# Patient Record
Sex: MALE | Race: WHITE | NOT HISPANIC OR LATINO | Employment: FULL TIME | URBAN - METROPOLITAN AREA
[De-identification: names, ages, dates, MRNs, and addresses within clinical notes are randomized per-mention and may not be internally consistent; named-entity substitution may affect disease eponyms.]

---

## 2019-12-21 ENCOUNTER — OFFICE VISIT (OUTPATIENT)
Dept: URGENT CARE | Facility: CLINIC | Age: 54
End: 2019-12-21
Payer: COMMERCIAL

## 2019-12-21 VITALS
HEART RATE: 70 BPM | HEIGHT: 70 IN | BODY MASS INDEX: 28.63 KG/M2 | SYSTOLIC BLOOD PRESSURE: 158 MMHG | OXYGEN SATURATION: 96 % | DIASTOLIC BLOOD PRESSURE: 98 MMHG | WEIGHT: 200 LBS | TEMPERATURE: 97.4 F | RESPIRATION RATE: 16 BRPM

## 2019-12-21 DIAGNOSIS — H10.31 ACUTE CONJUNCTIVITIS OF RIGHT EYE, UNSPECIFIED ACUTE CONJUNCTIVITIS TYPE: Primary | ICD-10-CM

## 2019-12-21 PROCEDURE — 99213 OFFICE O/P EST LOW 20 MIN: CPT | Performed by: PHYSICIAN ASSISTANT

## 2019-12-21 RX ORDER — TOBRAMYCIN 3 MG/ML
1 SOLUTION/ DROPS OPHTHALMIC
Qty: 5 ML | Refills: 0 | Status: SHIPPED | OUTPATIENT
Start: 2019-12-21

## 2019-12-21 RX ORDER — SIMVASTATIN 20 MG
20 TABLET ORAL
COMMUNITY

## 2019-12-21 NOTE — PROGRESS NOTES
[facility]        NAME: Christopher Jeffery is a 62 y o  male  : 1965    MRN: [id number]  DATE: 2019  TIME: 9:19 AM    Assessment and Plan   Acute conjunctivitis of right eye, unspecified acute conjunctivitis type [H10 31]  1  Acute conjunctivitis of right eye, unspecified acute conjunctivitis type  tobramycin (TOBREX) 0 3 % SOLN         Patient Instructions     Discussed condition with patient  He has acute conjunctivitis of the right eye which I will treat with tobramycin drops and I reviewed precautions with him regarding pinkeye  Examination revealed no sign of corneal abrasion or foreign body  Follow up with PCP in 3-5 days  Proceed to  ER if symptoms worsen  Chief Complaint     Chief Complaint   Patient presents with    Eye Problem     Right eye redness with irritation and itchiness and drainage  S/S started this morning         History of Present Illness       Patient presents with onset the past day of redness, irritation, watering, crusting and itching of his right eye  He does I were contact lenses  Denies foreign body or trauma  Denies any symptoms in the left eye  He denies any visual changes, eye pain, photophobia  Review of Systems   Review of Systems   Constitutional: Negative  Eyes: Positive for discharge (Watery), redness and itching  Negative for photophobia, pain and visual disturbance  Pertinent positives in right eye   Respiratory: Negative  Cardiovascular: Negative  Gastrointestinal: Negative  Genitourinary: Negative            Current Medications       Current Outpatient Medications:     simvastatin (ZOCOR) 20 mg tablet, Take 20 mg by mouth daily at bedtime, Disp: , Rfl:     tobramycin (TOBREX) 0 3 % SOLN, Administer 1 drop to the right eye every 4 (four) hours while awake, Disp: 5 mL, Rfl: 0    Current Allergies     Allergies as of 2019    (No Known Allergies)            The following portions of the patient's history were reviewed and updated as appropriate: allergies, current medications, past family history, past medical history, past social history, past surgical history and problem list      Past Medical History:   Diagnosis Date    High cholesterol        History reviewed  No pertinent surgical history  History reviewed  No pertinent family history  Medications have been verified  Objective   /98   Pulse 70   Temp (!) 97 4 °F (36 3 °C)   Resp 16   Ht 5' 10" (1 778 m)   Wt 90 7 kg (200 lb)   SpO2 96%   BMI 28 70 kg/m²        Physical Exam     Physical Exam   Constitutional: He is oriented to person, place, and time  He appears well-developed and well-nourished  No distress  Eyes:   Slit lamp exam:       The right eye shows no corneal abrasion, no foreign body and no fluorescein uptake  Right eye with conjunctival injection, watery discharge, mild crusting on lid margins, and photophobia  I instilled tetracaine drops and examined with fluorescein which revealed no corneal abrasion or imbedded foreign body  Left eye exam is normal    Neurological: He is alert and oriented to person, place, and time  Psychiatric: He has a normal mood and affect  Vitals reviewed

## 2019-12-21 NOTE — PATIENT INSTRUCTIONS

## 2023-07-28 ENCOUNTER — ANESTHESIA (EMERGENCY)
Dept: PERIOP | Facility: HOSPITAL | Age: 58
End: 2023-07-28
Payer: COMMERCIAL

## 2023-07-28 ENCOUNTER — APPOINTMENT (EMERGENCY)
Dept: CT IMAGING | Facility: HOSPITAL | Age: 58
End: 2023-07-28
Payer: COMMERCIAL

## 2023-07-28 ENCOUNTER — ANESTHESIA EVENT (EMERGENCY)
Dept: PERIOP | Facility: HOSPITAL | Age: 58
End: 2023-07-28
Payer: COMMERCIAL

## 2023-07-28 ENCOUNTER — HOSPITAL ENCOUNTER (OUTPATIENT)
Facility: HOSPITAL | Age: 58
Setting detail: OBSERVATION
End: 2023-07-29
Attending: SURGERY | Admitting: SURGERY
Payer: COMMERCIAL

## 2023-07-28 DIAGNOSIS — S41.112A LACERATION OF MULTIPLE SITES OF LEFT UPPER EXTREMITY, INITIAL ENCOUNTER: ICD-10-CM

## 2023-07-28 DIAGNOSIS — T07.XXXA MULTIPLE LACERATIONS: Primary | ICD-10-CM

## 2023-07-28 DIAGNOSIS — T14.91XA SUICIDE ATTEMPT (HCC): ICD-10-CM

## 2023-07-28 PROBLEM — S41.119A: Status: ACTIVE | Noted: 2023-07-28

## 2023-07-28 PROBLEM — G47.30 SLEEP APNEA: Status: ACTIVE | Noted: 2023-07-28

## 2023-07-28 PROBLEM — S11.91XA LACERATION OF NECK: Status: ACTIVE | Noted: 2023-07-28

## 2023-07-28 PROBLEM — I10 HTN (HYPERTENSION): Status: ACTIVE | Noted: 2023-07-28

## 2023-07-28 LAB
ABO GROUP BLD: NORMAL
ANION GAP SERPL CALCULATED.3IONS-SCNC: 8 MMOL/L
ANION GAP SERPL CALCULATED.3IONS-SCNC: 8 MMOL/L
BASE EXCESS BLDA CALC-SCNC: -2 MMOL/L (ref -2–3)
BASE EXCESS BLDA CALC-SCNC: -2 MMOL/L (ref -2–3)
BASOPHILS # BLD AUTO: 0.05 THOUSANDS/ÂΜL (ref 0–0.1)
BASOPHILS # BLD AUTO: 0.05 THOUSANDS/ÂΜL (ref 0–0.1)
BASOPHILS NFR BLD AUTO: 0 % (ref 0–1)
BASOPHILS NFR BLD AUTO: 0 % (ref 0–1)
BLD GP AB SCN SERPL QL: NEGATIVE
BLD GP AB SCN SERPL QL: NEGATIVE
BUN SERPL-MCNC: 16 MG/DL (ref 5–25)
BUN SERPL-MCNC: 16 MG/DL (ref 5–25)
CA-I BLD-SCNC: 1.1 MMOL/L (ref 1.12–1.32)
CA-I BLD-SCNC: 1.1 MMOL/L (ref 1.12–1.32)
CALCIUM SERPL-MCNC: 9.3 MG/DL (ref 8.4–10.2)
CALCIUM SERPL-MCNC: 9.3 MG/DL (ref 8.4–10.2)
CHLORIDE SERPL-SCNC: 104 MMOL/L (ref 96–108)
CHLORIDE SERPL-SCNC: 104 MMOL/L (ref 96–108)
CO2 SERPL-SCNC: 26 MMOL/L (ref 21–32)
CO2 SERPL-SCNC: 26 MMOL/L (ref 21–32)
CREAT SERPL-MCNC: 0.89 MG/DL (ref 0.6–1.3)
CREAT SERPL-MCNC: 0.89 MG/DL (ref 0.6–1.3)
EOSINOPHIL # BLD AUTO: 0 THOUSAND/ÂΜL (ref 0–0.61)
EOSINOPHIL # BLD AUTO: 0 THOUSAND/ÂΜL (ref 0–0.61)
EOSINOPHIL NFR BLD AUTO: 0 % (ref 0–6)
EOSINOPHIL NFR BLD AUTO: 0 % (ref 0–6)
ERYTHROCYTE [DISTWIDTH] IN BLOOD BY AUTOMATED COUNT: 13 % (ref 11.6–15.1)
ERYTHROCYTE [DISTWIDTH] IN BLOOD BY AUTOMATED COUNT: 13 % (ref 11.6–15.1)
GFR SERPL CREATININE-BSD FRML MDRD: 93 ML/MIN/1.73SQ M
GFR SERPL CREATININE-BSD FRML MDRD: 93 ML/MIN/1.73SQ M
GLUCOSE P FAST SERPL-MCNC: 142 MG/DL (ref 65–99)
GLUCOSE P FAST SERPL-MCNC: 142 MG/DL (ref 65–99)
GLUCOSE SERPL-MCNC: 142 MG/DL (ref 65–140)
GLUCOSE SERPL-MCNC: 142 MG/DL (ref 65–140)
GLUCOSE SERPL-MCNC: 166 MG/DL (ref 65–140)
GLUCOSE SERPL-MCNC: 166 MG/DL (ref 65–140)
HCO3 BLDA-SCNC: 21.3 MMOL/L (ref 24–30)
HCO3 BLDA-SCNC: 21.3 MMOL/L (ref 24–30)
HCT VFR BLD AUTO: 35.3 % (ref 36.5–49.3)
HCT VFR BLD AUTO: 35.3 % (ref 36.5–49.3)
HCT VFR BLD CALC: 33 % (ref 36.5–49.3)
HCT VFR BLD CALC: 33 % (ref 36.5–49.3)
HGB BLD-MCNC: 11.8 G/DL (ref 12–17)
HGB BLD-MCNC: 11.8 G/DL (ref 12–17)
HGB BLDA-MCNC: 11.2 G/DL (ref 12–17)
HGB BLDA-MCNC: 11.2 G/DL (ref 12–17)
HOLD SPECIMEN: NORMAL
IMM GRANULOCYTES # BLD AUTO: 0.18 THOUSAND/UL (ref 0–0.2)
IMM GRANULOCYTES # BLD AUTO: 0.18 THOUSAND/UL (ref 0–0.2)
IMM GRANULOCYTES NFR BLD AUTO: 1 % (ref 0–2)
IMM GRANULOCYTES NFR BLD AUTO: 1 % (ref 0–2)
LYMPHOCYTES # BLD AUTO: 1.64 THOUSANDS/ÂΜL (ref 0.6–4.47)
LYMPHOCYTES # BLD AUTO: 1.64 THOUSANDS/ÂΜL (ref 0.6–4.47)
LYMPHOCYTES NFR BLD AUTO: 8 % (ref 14–44)
LYMPHOCYTES NFR BLD AUTO: 8 % (ref 14–44)
MCH RBC QN AUTO: 31.3 PG (ref 26.8–34.3)
MCH RBC QN AUTO: 31.3 PG (ref 26.8–34.3)
MCHC RBC AUTO-ENTMCNC: 33.4 G/DL (ref 31.4–37.4)
MCHC RBC AUTO-ENTMCNC: 33.4 G/DL (ref 31.4–37.4)
MCV RBC AUTO: 94 FL (ref 82–98)
MCV RBC AUTO: 94 FL (ref 82–98)
MONOCYTES # BLD AUTO: 0.46 THOUSAND/ÂΜL (ref 0.17–1.22)
MONOCYTES # BLD AUTO: 0.46 THOUSAND/ÂΜL (ref 0.17–1.22)
MONOCYTES NFR BLD AUTO: 2 % (ref 4–12)
MONOCYTES NFR BLD AUTO: 2 % (ref 4–12)
NEUTROPHILS # BLD AUTO: 17.1 THOUSANDS/ÂΜL (ref 1.85–7.62)
NEUTROPHILS # BLD AUTO: 17.1 THOUSANDS/ÂΜL (ref 1.85–7.62)
NEUTS SEG NFR BLD AUTO: 89 % (ref 43–75)
NEUTS SEG NFR BLD AUTO: 89 % (ref 43–75)
NRBC BLD AUTO-RTO: 0 /100 WBCS
NRBC BLD AUTO-RTO: 0 /100 WBCS
PCO2 BLD: 22 MMOL/L (ref 21–32)
PCO2 BLD: 22 MMOL/L (ref 21–32)
PCO2 BLD: 30.4 MM HG (ref 42–50)
PCO2 BLD: 30.4 MM HG (ref 42–50)
PH BLD: 7.46 [PH] (ref 7.3–7.4)
PH BLD: 7.46 [PH] (ref 7.3–7.4)
PLATELET # BLD AUTO: 352 THOUSANDS/UL (ref 149–390)
PLATELET # BLD AUTO: 352 THOUSANDS/UL (ref 149–390)
PMV BLD AUTO: 9.8 FL (ref 8.9–12.7)
PMV BLD AUTO: 9.8 FL (ref 8.9–12.7)
PO2 BLD: 38 MM HG (ref 35–45)
PO2 BLD: 38 MM HG (ref 35–45)
POTASSIUM BLD-SCNC: 3.6 MMOL/L (ref 3.5–5.3)
POTASSIUM BLD-SCNC: 3.6 MMOL/L (ref 3.5–5.3)
POTASSIUM SERPL-SCNC: 4.2 MMOL/L (ref 3.5–5.3)
POTASSIUM SERPL-SCNC: 4.2 MMOL/L (ref 3.5–5.3)
RBC # BLD AUTO: 3.77 MILLION/UL (ref 3.88–5.62)
RBC # BLD AUTO: 3.77 MILLION/UL (ref 3.88–5.62)
RH BLD: NEGATIVE
SAO2 % BLD FROM PO2: 76 % (ref 60–85)
SAO2 % BLD FROM PO2: 76 % (ref 60–85)
SODIUM BLD-SCNC: 140 MMOL/L (ref 136–145)
SODIUM BLD-SCNC: 140 MMOL/L (ref 136–145)
SODIUM SERPL-SCNC: 138 MMOL/L (ref 135–147)
SODIUM SERPL-SCNC: 138 MMOL/L (ref 135–147)
SPECIMEN EXPIRATION DATE: NORMAL
SPECIMEN EXPIRATION DATE: NORMAL
SPECIMEN SOURCE: ABNORMAL
SPECIMEN SOURCE: ABNORMAL
WBC # BLD AUTO: 19.43 THOUSAND/UL (ref 4.31–10.16)
WBC # BLD AUTO: 19.43 THOUSAND/UL (ref 4.31–10.16)

## 2023-07-28 PROCEDURE — 86850 RBC ANTIBODY SCREEN: CPT | Performed by: SURGERY

## 2023-07-28 PROCEDURE — 82947 ASSAY GLUCOSE BLOOD QUANT: CPT

## 2023-07-28 PROCEDURE — 84132 ASSAY OF SERUM POTASSIUM: CPT

## 2023-07-28 PROCEDURE — 85014 HEMATOCRIT: CPT

## 2023-07-28 PROCEDURE — 93308 TTE F-UP OR LMTD: CPT | Performed by: PHYSICIAN ASSISTANT

## 2023-07-28 PROCEDURE — NC001 PR NO CHARGE: Performed by: PHYSICIAN ASSISTANT

## 2023-07-28 PROCEDURE — 82330 ASSAY OF CALCIUM: CPT

## 2023-07-28 PROCEDURE — 36415 COLL VENOUS BLD VENIPUNCTURE: CPT | Performed by: SURGERY

## 2023-07-28 PROCEDURE — 99285 EMERGENCY DEPT VISIT HI MDM: CPT

## 2023-07-28 PROCEDURE — 82803 BLOOD GASES ANY COMBINATION: CPT

## 2023-07-28 PROCEDURE — 86900 BLOOD TYPING SEROLOGIC ABO: CPT | Performed by: SURGERY

## 2023-07-28 PROCEDURE — 99223 1ST HOSP IP/OBS HIGH 75: CPT | Performed by: SURGERY

## 2023-07-28 PROCEDURE — 86901 BLOOD TYPING SEROLOGIC RH(D): CPT | Performed by: SURGERY

## 2023-07-28 PROCEDURE — 84295 ASSAY OF SERUM SODIUM: CPT

## 2023-07-28 PROCEDURE — 85025 COMPLETE CBC W/AUTO DIFF WBC: CPT | Performed by: PHYSICIAN ASSISTANT

## 2023-07-28 PROCEDURE — 90715 TDAP VACCINE 7 YRS/> IM: CPT | Performed by: PHYSICIAN ASSISTANT

## 2023-07-28 PROCEDURE — 99244 OFF/OP CNSLTJ NEW/EST MOD 40: CPT | Performed by: ORTHOPAEDIC SURGERY

## 2023-07-28 PROCEDURE — 80048 BASIC METABOLIC PNL TOTAL CA: CPT | Performed by: PHYSICIAN ASSISTANT

## 2023-07-28 PROCEDURE — 70498 CT ANGIOGRAPHY NECK: CPT

## 2023-07-28 PROCEDURE — G0425 INPT/ED TELECONSULT30: HCPCS | Performed by: GENERAL PRACTICE

## 2023-07-28 PROCEDURE — 12007 RPR S/N/AX/GEN/TRNK >30.0 CM: CPT | Performed by: SURGERY

## 2023-07-28 PROCEDURE — 29125 APPL SHORT ARM SPLINT STATIC: CPT | Performed by: PHYSICIAN ASSISTANT

## 2023-07-28 PROCEDURE — 76705 ECHO EXAM OF ABDOMEN: CPT | Performed by: PHYSICIAN ASSISTANT

## 2023-07-28 RX ORDER — MAGNESIUM HYDROXIDE 1200 MG/15ML
LIQUID ORAL AS NEEDED
Status: DISCONTINUED | OUTPATIENT
Start: 2023-07-28 | End: 2023-07-28 | Stop reason: HOSPADM

## 2023-07-28 RX ORDER — HYDROMORPHONE HCL/PF 1 MG/ML
SYRINGE (ML) INJECTION AS NEEDED
Status: DISCONTINUED | OUTPATIENT
Start: 2023-07-28 | End: 2023-07-28

## 2023-07-28 RX ORDER — LISINOPRIL 10 MG/1
10 TABLET ORAL DAILY
COMMUNITY
End: 2023-08-14 | Stop reason: ALTCHOICE

## 2023-07-28 RX ORDER — ENOXAPARIN SODIUM 100 MG/ML
30 INJECTION SUBCUTANEOUS EVERY 12 HOURS SCHEDULED
Status: DISCONTINUED | OUTPATIENT
Start: 2023-07-28 | End: 2023-07-30 | Stop reason: HOSPADM

## 2023-07-28 RX ORDER — MIDAZOLAM HYDROCHLORIDE 2 MG/2ML
INJECTION, SOLUTION INTRAMUSCULAR; INTRAVENOUS AS NEEDED
Status: DISCONTINUED | OUTPATIENT
Start: 2023-07-28 | End: 2023-07-28

## 2023-07-28 RX ORDER — CEFAZOLIN SODIUM 1 G/3ML
INJECTION, POWDER, FOR SOLUTION INTRAMUSCULAR; INTRAVENOUS AS NEEDED
Status: DISCONTINUED | OUTPATIENT
Start: 2023-07-28 | End: 2023-07-28

## 2023-07-28 RX ORDER — AMLODIPINE BESYLATE 5 MG/1
5 TABLET ORAL DAILY
COMMUNITY

## 2023-07-28 RX ORDER — SODIUM CHLORIDE, SODIUM LACTATE, POTASSIUM CHLORIDE, CALCIUM CHLORIDE 600; 310; 30; 20 MG/100ML; MG/100ML; MG/100ML; MG/100ML
INJECTION, SOLUTION INTRAVENOUS CONTINUOUS PRN
Status: DISCONTINUED | OUTPATIENT
Start: 2023-07-28 | End: 2023-07-28

## 2023-07-28 RX ORDER — LIDOCAINE HYDROCHLORIDE 10 MG/ML
INJECTION, SOLUTION EPIDURAL; INFILTRATION; INTRACAUDAL; PERINEURAL AS NEEDED
Status: DISCONTINUED | OUTPATIENT
Start: 2023-07-28 | End: 2023-07-28

## 2023-07-28 RX ORDER — ACETAMINOPHEN 325 MG/1
975 TABLET ORAL EVERY 8 HOURS SCHEDULED
Status: DISCONTINUED | OUTPATIENT
Start: 2023-07-28 | End: 2023-07-30 | Stop reason: HOSPADM

## 2023-07-28 RX ORDER — FENTANYL CITRATE/PF 50 MCG/ML
25 SYRINGE (ML) INJECTION
Status: DISCONTINUED | OUTPATIENT
Start: 2023-07-28 | End: 2023-07-28 | Stop reason: HOSPADM

## 2023-07-28 RX ORDER — FENTANYL CITRATE 50 UG/ML
INJECTION, SOLUTION INTRAMUSCULAR; INTRAVENOUS AS NEEDED
Status: DISCONTINUED | OUTPATIENT
Start: 2023-07-28 | End: 2023-07-28

## 2023-07-28 RX ORDER — SUCCINYLCHOLINE/SOD CL,ISO/PF 100 MG/5ML
SYRINGE (ML) INTRAVENOUS AS NEEDED
Status: DISCONTINUED | OUTPATIENT
Start: 2023-07-28 | End: 2023-07-28

## 2023-07-28 RX ORDER — ONDANSETRON 2 MG/ML
4 INJECTION INTRAMUSCULAR; INTRAVENOUS ONCE AS NEEDED
Status: DISCONTINUED | OUTPATIENT
Start: 2023-07-28 | End: 2023-07-28 | Stop reason: HOSPADM

## 2023-07-28 RX ORDER — DEXAMETHASONE SODIUM PHOSPHATE 10 MG/ML
INJECTION, SOLUTION INTRAMUSCULAR; INTRAVENOUS AS NEEDED
Status: DISCONTINUED | OUTPATIENT
Start: 2023-07-28 | End: 2023-07-28

## 2023-07-28 RX ORDER — PANTOPRAZOLE SODIUM 40 MG/1
40 TABLET, DELAYED RELEASE ORAL
Status: DISCONTINUED | OUTPATIENT
Start: 2023-07-28 | End: 2023-07-30 | Stop reason: HOSPADM

## 2023-07-28 RX ORDER — AMLODIPINE BESYLATE 5 MG/1
5 TABLET ORAL DAILY
Status: DISCONTINUED | OUTPATIENT
Start: 2023-07-28 | End: 2023-07-30 | Stop reason: HOSPADM

## 2023-07-28 RX ORDER — HYDROMORPHONE HCL IN WATER/PF 6 MG/30 ML
0.2 PATIENT CONTROLLED ANALGESIA SYRINGE INTRAVENOUS
Status: DISCONTINUED | OUTPATIENT
Start: 2023-07-28 | End: 2023-07-28 | Stop reason: HOSPADM

## 2023-07-28 RX ORDER — SODIUM CHLORIDE, SODIUM GLUCONATE, SODIUM ACETATE, POTASSIUM CHLORIDE, MAGNESIUM CHLORIDE, SODIUM PHOSPHATE, DIBASIC, AND POTASSIUM PHOSPHATE .53; .5; .37; .037; .03; .012; .00082 G/100ML; G/100ML; G/100ML; G/100ML; G/100ML; G/100ML; G/100ML
75 INJECTION, SOLUTION INTRAVENOUS CONTINUOUS
Status: DISCONTINUED | OUTPATIENT
Start: 2023-07-28 | End: 2023-07-28

## 2023-07-28 RX ORDER — PROPOFOL 10 MG/ML
INJECTION, EMULSION INTRAVENOUS AS NEEDED
Status: DISCONTINUED | OUTPATIENT
Start: 2023-07-28 | End: 2023-07-28

## 2023-07-28 RX ORDER — OMEPRAZOLE 40 MG/1
40 CAPSULE, DELAYED RELEASE ORAL DAILY
COMMUNITY

## 2023-07-28 RX ORDER — ATORVASTATIN CALCIUM 20 MG/1
20 TABLET, FILM COATED ORAL DAILY
COMMUNITY

## 2023-07-28 RX ORDER — ONDANSETRON 2 MG/ML
INJECTION INTRAMUSCULAR; INTRAVENOUS AS NEEDED
Status: DISCONTINUED | OUTPATIENT
Start: 2023-07-28 | End: 2023-07-28

## 2023-07-28 RX ORDER — ROCURONIUM BROMIDE 10 MG/ML
INJECTION, SOLUTION INTRAVENOUS AS NEEDED
Status: DISCONTINUED | OUTPATIENT
Start: 2023-07-28 | End: 2023-07-28

## 2023-07-28 RX ORDER — ATORVASTATIN CALCIUM 20 MG/1
20 TABLET, FILM COATED ORAL DAILY
Status: DISCONTINUED | OUTPATIENT
Start: 2023-07-28 | End: 2023-07-30 | Stop reason: HOSPADM

## 2023-07-28 RX ADMIN — IOHEXOL 100 ML: 350 INJECTION, SOLUTION INTRAVENOUS at 06:26

## 2023-07-28 RX ADMIN — DEXAMETHASONE SODIUM PHOSPHATE 10 MG: 10 INJECTION, SOLUTION INTRAMUSCULAR; INTRAVENOUS at 07:16

## 2023-07-28 RX ADMIN — SODIUM CHLORIDE, SODIUM LACTATE, POTASSIUM CHLORIDE, AND CALCIUM CHLORIDE: .6; .31; .03; .02 INJECTION, SOLUTION INTRAVENOUS at 06:41

## 2023-07-28 RX ADMIN — ENOXAPARIN SODIUM 30 MG: 30 INJECTION SUBCUTANEOUS at 21:20

## 2023-07-28 RX ADMIN — PANTOPRAZOLE SODIUM 40 MG: 40 TABLET, DELAYED RELEASE ORAL at 13:44

## 2023-07-28 RX ADMIN — AMLODIPINE BESYLATE 5 MG: 5 TABLET ORAL at 13:44

## 2023-07-28 RX ADMIN — ATORVASTATIN CALCIUM 20 MG: 20 TABLET, FILM COATED ORAL at 13:44

## 2023-07-28 RX ADMIN — HYDROMORPHONE HYDROCHLORIDE 0.5 MG: 1 INJECTION, SOLUTION INTRAMUSCULAR; INTRAVENOUS; SUBCUTANEOUS at 07:36

## 2023-07-28 RX ADMIN — SODIUM CHLORIDE, SODIUM LACTATE, POTASSIUM CHLORIDE, AND CALCIUM CHLORIDE: .6; .31; .03; .02 INJECTION, SOLUTION INTRAVENOUS at 07:18

## 2023-07-28 RX ADMIN — ENOXAPARIN SODIUM 30 MG: 30 INJECTION SUBCUTANEOUS at 13:44

## 2023-07-28 RX ADMIN — CEFAZOLIN 2000 MG: 1 INJECTION, POWDER, FOR SOLUTION INTRAMUSCULAR; INTRAVENOUS at 06:45

## 2023-07-28 RX ADMIN — FENTANYL CITRATE 100 MCG: 50 INJECTION, SOLUTION INTRAMUSCULAR; INTRAVENOUS at 06:43

## 2023-07-28 RX ADMIN — ONDANSETRON 4 MG: 2 INJECTION INTRAMUSCULAR; INTRAVENOUS at 07:16

## 2023-07-28 RX ADMIN — SODIUM CHLORIDE, SODIUM GLUCONATE, SODIUM ACETATE, POTASSIUM CHLORIDE, MAGNESIUM CHLORIDE, SODIUM PHOSPHATE, DIBASIC, AND POTASSIUM PHOSPHATE 75 ML/HR: .53; .5; .37; .037; .03; .012; .00082 INJECTION, SOLUTION INTRAVENOUS at 10:28

## 2023-07-28 RX ADMIN — SUGAMMADEX 200 MG: 100 INJECTION, SOLUTION INTRAVENOUS at 08:04

## 2023-07-28 RX ADMIN — LIDOCAINE HYDROCHLORIDE 50 MG: 10 INJECTION, SOLUTION EPIDURAL; INFILTRATION; INTRACAUDAL; PERINEURAL at 06:43

## 2023-07-28 RX ADMIN — MIDAZOLAM HYDROCHLORIDE 2 MG: 1 INJECTION, SOLUTION INTRAMUSCULAR; INTRAVENOUS at 06:40

## 2023-07-28 RX ADMIN — TETANUS TOXOID, REDUCED DIPHTHERIA TOXOID AND ACELLULAR PERTUSSIS VACCINE, ADSORBED 0.5 ML: 5; 2.5; 8; 8; 2.5 SUSPENSION INTRAMUSCULAR at 10:25

## 2023-07-28 RX ADMIN — Medication 100 MG: at 06:43

## 2023-07-28 RX ADMIN — ACETAMINOPHEN 975 MG: 325 TABLET, FILM COATED ORAL at 21:19

## 2023-07-28 RX ADMIN — PROPOFOL 200 MG: 10 INJECTION, EMULSION INTRAVENOUS at 06:43

## 2023-07-28 RX ADMIN — ACETAMINOPHEN 975 MG: 325 TABLET, FILM COATED ORAL at 13:44

## 2023-07-28 RX ADMIN — ROCURONIUM BROMIDE 30 MG: 10 INJECTION, SOLUTION INTRAVENOUS at 06:46

## 2023-07-28 NOTE — CONSULTS
Orthopedics   Masood Machado 59 y.o. male MRN: [id number]  Unit/Bed#: AN OR MAIN      Chief Complaint:   Suicide attempt, wrist lacerations, tendon injury to left wrist.     HPI:  59 y.o. male currently admitted secondary to suicide attempt roughly 16 hours ago per trauma attending. He lacerated bilateral wrists, and bilateral sides of neck. Reportedly he was found by family after spending prolonged time in the woods prior to returning home, and was then brought into the ER for evaluation. Per trauma attending, the wounds to the right wrist, and bilateral neck are superficial. She noted intra operatively that the left wrist is deeper, with concerns of tendon injury, for which intra operative consultation was requested. Intra operative discussion was held with hand surgeon, Dr. Amie Tony. No information is able to be obtained from patient at this time, as he is intubated/sedated on the operating room table. His bilateral wrists are currently dressed post operatively. Review Of Systems:   · Unable to obtain, patient currently intubated, on the OR table. Past Medical History:   No past medical history on file. Past Surgical History:   No past surgical history on file. Family History:  Family history reviewed and non-contributory  No family history on file.     Social History:  Social History     Socioeconomic History   • Marital status: /Civil Union     Spouse name: Not on file   • Number of children: Not on file   • Years of education: Not on file   • Highest education level: Not on file   Occupational History   • Not on file   Tobacco Use   • Smoking status: Not on file   • Smokeless tobacco: Not on file   Substance and Sexual Activity   • Alcohol use: Not on file   • Drug use: Not on file   • Sexual activity: Not on file   Other Topics Concern   • Not on file   Social History Narrative   • Not on file     Social Determinants of Health     Financial Resource Strain: Not on file   Food Insecurity: Not on file   Transportation Needs: Not on file   Physical Activity: Not on file   Stress: Not on file   Social Connections: Not on file   Intimate Partner Violence: Not on file   Housing Stability: Not on file       Allergies:   No Known Allergies        Labs:  0   Lab Value Date/Time    HCT 33 (L) 07/28/2023 0612    HGB 11.2 (L) 07/28/2023 0612       Meds:    Current Facility-Administered Medications:   •  enoxaparin (LOVENOX) subcutaneous injection 30 mg, 30 mg, Subcutaneous, Q12H, Pelon Nava PA-C  •  multi-electrolyte (PLASMALYTE-A/ISOLYTE-S PH 7.4) IV solution, 75 mL/hr, Intravenous, Continuous, Juliocesar Nava PA-C  •  sodium chloride 0.9 % irrigation solution, , , PRN, Glenna Q To, DO, 1,000 mL at 07/28/23 0712  •  sodium chloride 0.9 % irrigation solution, , , PRN, Glenna Q To, DO, 1,000 mL at 07/28/23 0749  •  sterile water irrigation solution, , , PRN, Mamie Cluck Q To, DO, 1,000 mL at 07/28/23 0750    Facility-Administered Medications Ordered in Other Encounters:   •  ceFAZolin (ANCEF) injection, , Intravenous, PRN, Rasheed Seton, CRNA, 2,000 mg at 07/28/23 0645  •  dexamethasone (PF) (DECADRON) injection, , Intravenous, PRN, Abdelrahman Riedel, CRNA, 10 mg at 07/28/23 0583  •  fentanyl citrate (PF) 100 MCG/2ML, , Intravenous, PRN, Rasheed Seton, CRNA, 100 mcg at 07/28/23 2990  •  HYDROmorphone (DILAUDID) injection, , Intravenous, PRN, Parthenia Tatiana, CRNA, 0.5 mg at 07/28/23 0736  •  lactated ringers infusion, , Intravenous, Continuous PRN, Rasheed Seton, CRNA, New Bag at 07/28/23 9074  •  lidocaine (PF) (XYLOCAINE-MPF) 1 % injection, , Intravenous, PRN, Rasheed Seton, CRNA, 50 mg at 07/28/23 0281  •  midazolam (VERSED) injection, , Intravenous, PRN, Rasheed Seton, CRNA, 2 mg at 07/28/23 0640  •  ondansetron (ZOFRAN) injection, , Intravenous, PRN, Elpidio Riedel, CRNA, 4 mg at 07/28/23 0716  •  phenylephrine bolus from bag, , Intravenous, PRN, Elpidio Riedel, CRNA, 200 mcg at 07/28/23 1403  •  propofol (DIPRIVAN) 200 MG/20ML bolus injection, , Intravenous, PRN, Janeane Sprung, CRNA, 200 mg at 07/28/23 4344  •  ROCuronium (ZEMURON) injection, , Intravenous, PRN, Janeane Sprung, CRNA, 30 mg at 07/28/23 8192  •  Succinylcholine Chloride 100 mg/5 mL syringe, , Intravenous, PRN, Janeane Sprung, CRNA, 100 mg at 07/28/23 7119  •  Sugammadex Sodium (BRIDION), , Intravenous, PRN, Dewain Showers, CRNA, 200 mg at 07/28/23 0804    Blood Culture:   No results found for: "BLOODCX"    Wound Culture:   No results found for: "WOUNDCULT"    Ins and Outs:  I/O last 24 hours: In: 1500 [I.V.:1500]  Out: -           Physical Exam:   /72   Pulse 85   Temp 98.3 °F (36.8 °C)   Resp 20   Wt 80.8 kg (178 lb 2.1 oz)   SpO2 95%   Gen: Currently on operating room table. He has lacerations to the bilateral neck. Sutures in place. HEENT: mucous membranes moist.   Respiratory: intubated. Cardiovascular: Well Perfused peripherally  Abdomen: nondistended, no peritoneal signs  Musculoskeletal: bilateral upper extremity  · Surgical dressings in place to bilateral upper extremities. · Unable to assess sensation or motor exam given patients current mental acuity. Patient seen and re-examined in post operative holding and again when he was able to participate in his room on the floor. · Surgical dressings are clean and dry. · He is able to make a full composite fist  · He is able to extend all digits fully. He is able to move all digits independently in flexion/extension and abduct the thumb. · Sensation intact Axillary, Musculocutaneous, Radial, Ulna and Median  · 5/5 motor to Axillary, Musculocutaneous, Radial, Ulna and Median  · 2+ Radial & Ulnar Pulses  · Musculature is soft and compressible  · Digits are warm and well perfused. Radiology:   No pertinent imaging to review. Procedure- Orthopedics   Marleni Harkins 59 y.o. male MRN: [id number]  Unit/Bed#:  W -01    Procedure:  splint application left wrist    Patient was placed in a well padded volar splint in neutral position at the wrist. Pt tolerated the procedure well and was neurovascularly intact both pre and post procedure.    _*_*_*_*_*_*_*_*_*_*_*_*_*_*_*_*_*_*_*_*_*_*_*_*_*_*_*_*_*_*_*_*_*_*_*_*_*_*_*_*_*    Assessment:  59 y.o.male s/p suicide attempt with bilateral wrist lacerations, the left with associated tendon injury noted intra operatively with trauma team, s/p intra operative consultation with hand surgeon, Dr. Shay Fulton. Plan:   · Non weight bearing to the left hand/wrist in volar splint. · No immediate hand surgery intervention anticipated at this time. · Medical management per primary team.   · Dispo: hand surgery will sign off at this time. · Patient to follow up with Dr. Shay Fulton for re-evaluation upon discharge in 1 week. Discussed with patient and family at bedside.      Mary Downey PA-C

## 2023-07-28 NOTE — ED PROVIDER NOTES
Emergency Department Airway Evaluation and Management Form    History  Obtained from: EMS  Patient has no allergy information on record. No chief complaint on file. HPI     Patient presents as a level A trauma alert after a suicide attempt where the patient lacerated his neck, bilaterally, and arms. No past medical history on file. No past surgical history on file. No family history on file. I have reviewed and agree with the history as documented. Review of Systems     ROS per trauma note. Physical Exam  There were no vitals taken for this visit. Physical Exam     Airway intact. Remainder of physical exam per trauma note.      ED Medications  Medications - No data to display    Intubation  Procedures    Notes  Airway intact    Final Diagnosis  Final diagnoses:   None       ED Provider  Electronically Signed by     Dre Recinos MD  07/28/23 9149

## 2023-07-28 NOTE — ANESTHESIA POSTPROCEDURE EVALUATION
Post-Op Assessment Note    CV Status:  Stable  Pain Score: 0    Pain management: adequate     Mental Status:  Alert and sleepy   Hydration Status:  Euvolemic   PONV Controlled:  Controlled   Airway Patency:  Patent      Post Op Vitals Reviewed: Yes      Staff: CRNA         No notable events documented.     BP   142/91   Temp   96.2   Pulse  76   Resp   12   SpO2   94

## 2023-07-28 NOTE — PLAN OF CARE
Problem: PAIN - ADULT  Goal: Verbalizes/displays adequate comfort level or baseline comfort level  Description: Interventions:  - Encourage patient to monitor pain and request assistance  - Assess pain using appropriate pain scale  - Administer analgesics based on type and severity of pain and evaluate response  - Implement non-pharmacological measures as appropriate and evaluate response  - Consider cultural and social influences on pain and pain management  - Notify physician/advanced practitioner if interventions unsuccessful or patient reports new pain  Outcome: Progressing     Problem: INFECTION - ADULT  Goal: Absence or prevention of progression during hospitalization  Description: INTERVENTIONS:  - Assess and monitor for signs and symptoms of infection  - Monitor lab/diagnostic results  - Monitor all insertion sites, i.e. indwelling lines, tubes, and drains  - Monitor endotracheal if appropriate and nasal secretions for changes in amount and color  - [place] appropriate cooling/warming therapies per order  - Administer medications as ordered  - Instruct and encourage patient and family to use good hand hygiene technique  - Identify and instruct in appropriate isolation precautions for identified infection/condition  Outcome: Progressing  Goal: Absence of fever/infection during neutropenic period  Description: INTERVENTIONS:  - Monitor WBC    Outcome: Progressing     Problem: INFECTION - ADULT  Goal: Absence of fever/infection during neutropenic period  Description: INTERVENTIONS:  - Monitor WBC    Outcome: Progressing     Problem: DISCHARGE PLANNING  Goal: Discharge to home or other facility with appropriate resources  Description: INTERVENTIONS:  - Identify barriers to discharge w/patient and caregiver  - Arrange for needed discharge resources and transportation as appropriate  - Identify discharge learning needs (meds, wound care, etc.)  - Arrange for interpretive services to assist at discharge as needed  - Refer to Case Management Department for coordinating discharge planning if the patient needs post-hospital services based on physician/advanced practitioner order or complex needs related to functional status, cognitive ability, or social support system  Outcome: Progressing     Problem: Knowledge Deficit  Goal: Patient/family/caregiver demonstrates understanding of disease process, treatment plan, medications, and discharge instructions  Description: Complete learning assessment and assess knowledge base. Interventions:  - Provide teaching at level of understanding  - Provide teaching via preferred learning methods  Outcome: Progressing     Problem: Nutrition/Hydration-ADULT  Goal: Nutrient/Hydration intake appropriate for improving, restoring or maintaining nutritional needs  Description: Monitor and assess patient's nutrition/hydration status for malnutrition. Collaborate with interdisciplinary team and initiate plan and interventions as ordered. Monitor patient's weight and dietary intake as ordered or per policy. Utilize nutrition screening tool and intervene as necessary. Determine patient's food preferences and provide high-protein, high-caloric foods as appropriate.      INTERVENTIONS:  - Monitor oral intake, urinary output, labs, and treatment plans  - Assess nutrition and hydration status and recommend course of action  - Evaluate amount of meals eaten  - Assist patient with eating if necessary   - Allow adequate time for meals  - Recommend/ encourage appropriate diets, oral nutritional supplements, and vitamin/mineral supplements  - Order, calculate, and assess calorie counts as needed  - Recommend, monitor, and adjust tube feedings and TPN/PPN based on assessed needs  - Assess need for intravenous fluids  - Provide specific nutrition/hydration education as appropriate  - Include patient/family/caregiver in decisions related to nutrition  Outcome: Progressing

## 2023-07-28 NOTE — PROGRESS NOTES
Post-operative Evaluation:     Subjective: Patient offering minimal complaints and on presentation. Reports he is feeling well in the postoperative setting. Denying any new pain. No nausea or vomiting. No chest pain or shortness of breath. No hoarseness of his voice. Reports he is hungry and would like to eat. Otherwise resting and doing well.     Objective:  General: No acute distress  HEENT: Neck lacerations currently covered with dressing; otherwise appear clean, dry, intact  Cardiac: Regular rate and rhythm  Lungs: CTA bilaterally  Abdomen: Nontender, nondistended  Extremities: +2 pulses on extremities; capillary refill appropriate as well; dressings in place over bilateral wrist/arm lacerations  Skin: Intact  Neuro: GCS 15    Assessment and plan:   -Patient medically stable for placement at psychiatric facility  -orthopedic consultation for left hand possible ligamentous injury  -Psychiatric consultation in place  -Continue one-to-one observation  -Family updated at bedside  -Continue as needed pain support  -Resume home medications

## 2023-07-28 NOTE — H&P
[facility]  H&P  Name: Mohit Chatman 59 y.o. male I MRN: [id number]  Unit/Bed#: OR POOL I Date of Admission: 7/28/2023   Date of Service: 7/28/2023 I Hospital Day: 0      Assessment/Plan   Suicide attempt [facility])  Assessment & Plan  - Suicide attempt by cutting neck and bilateral arms   - Below noted injuries  - Psych consult  - 1:1 monitoring     Laceration of multiple sites of arm  Assessment & Plan  - Bilateral antecubital fossa and anterior wrist lacerations   - Wounds open for approximately 16 hours prior to arrival   - OR for washout and loose approximation  - Local wound care  - Close monitoring of wounds with increased infection risk due to prolonged time to closure prior to arrival    HTN (hypertension)  Assessment & Plan  - resume home medications    Laceration of neck  Assessment & Plan  - Bilateral neck lacerations deep to the SCM   - Superficial lacerations midline  - CTA neck without injury to the major cervical vessels   - OR for washout and loose approximation - Local wound care  - Close monitoring of wounds with increased infection risk due to prolonged time to closure prior to arrival          Trauma Alert: Level A   Model of Arrival: Ambulance    Trauma Team: Attending TO and AP [address] [place] [address]  Consultants:     None     History of Present Illness     Chief Complaint: Suicide attempt  Mechanism:Other: Self inflicted lacerations to the neck     HPI:    Mohit Chatman is a 59 y.o. male with PMH HTN who presents after suicide attempt by self inflicted lacerations to the bilateral neck and arms. Patient reports he went into the woods approximately 16 hours before calling EMS. He denies LOC or fall. He was able to walk out of the woods and back home early this am. He denies any other complaints. Review of Systems   Constitutional: Negative for activity change, appetite change, diaphoresis, fatigue and fever.    HENT: Negative for congestion, ear discharge, ear pain, facial swelling, hearing loss, mouth sores, nosebleeds, postnasal drip, rhinorrhea, sinus pressure, sinus pain, sneezing and sore throat. Eyes: Negative for photophobia, pain and redness. Respiratory: Negative for cough, chest tightness, shortness of breath and wheezing. Cardiovascular: Negative for chest pain and palpitations. Gastrointestinal: Negative for abdominal distention, abdominal pain, blood in stool, constipation, diarrhea, nausea and vomiting. Genitourinary: Negative for dysuria, frequency, hematuria and urgency. Musculoskeletal: Negative for back pain and neck pain. Skin: Positive for wound. Neurological: Negative for dizziness, seizures, syncope, weakness, numbness and headaches. 12-point, complete review of systems was reviewed and negative except as stated above. Historical Information     Past Medical History:   Diagnosis Date   • HLD (hyperlipidemia)    • HTN (hypertension)      History reviewed. No pertinent surgical history. There is no immunization history on file for this patient.   Last Tetanus: Update today  Family History: Non-contributory     Meds/Allergies   all current active meds have been reviewed   No Known Allergies    Objective   Initial Vitals:   Temperature: 97.9 °F (36.6 °C) (07/28/23 0559)  Pulse: 95 (07/28/23 0559)  Respirations: 18 (07/28/23 0559)  Blood Pressure: 129/71 (07/28/23 0559)    Primary Survey:   Airway:        Status: patent;        Pre-hospital Interventions: none        Hospital Interventions: none  Breathing:        Pre-hospital Interventions: none       Effort: normal       Right breath sounds: normal       Left breath sounds: normal  Circulation:        Rhythm: regular       Rate: regular   Right Pulses Left Pulses    R radial: 2+  R femoral: 2+  R pedal: 2+     L radial: 2+  L femoral: 2+  L pedal: 2+       Disability:        GCS: Eye: 4; Verbal: 5 Motor: 6 Total: 15       Right Pupil: round;  reactive         Left Pupil:  round; reactive      R Motor Strength L Motor Strength    R : 5/5  R dorsiflex: 5/5  R plantarflex: 5/5 L : 5/5  L dorsiflex: 5/5  L plantarflex: 5/5        Sensory:  No sensory deficit  Exposure:       Completed: Yes      Secondary Survey:  Physical Exam  Vitals and nursing note reviewed. Constitutional:       General: He is not in acute distress. Appearance: Normal appearance. He is not ill-appearing or toxic-appearing. HENT:      Head: Normocephalic. Right Ear: Tympanic membrane normal.      Left Ear: Tympanic membrane normal.      Nose: Nose normal. No congestion or rhinorrhea. Mouth/Throat:      Mouth: Mucous membranes are moist.      Pharynx: Oropharynx is clear. No oropharyngeal exudate. Eyes:      Extraocular Movements: Extraocular movements intact. Conjunctiva/sclera: Conjunctivae normal.      Pupils: Pupils are equal, round, and reactive to light. Cardiovascular:      Rate and Rhythm: Normal rate. Heart sounds: No murmur heard. No friction rub. No gallop. Pulmonary:      Effort: Pulmonary effort is normal.      Breath sounds: No wheezing, rhonchi or rales. Abdominal:      General: Abdomen is flat. There is no distension. Palpations: Abdomen is soft. Tenderness: There is no abdominal tenderness. There is no guarding or rebound. Musculoskeletal:      Right shoulder: Normal.      Left shoulder: Normal.      Right upper arm: Normal.      Left upper arm: Normal.      Right elbow: Normal.      Left elbow: Normal.      Right forearm: Normal.      Left forearm: Normal.      Right wrist: Normal.      Left wrist: Normal.      Right hand: Normal.      Left hand: Normal.      Cervical back: No deformity or tenderness. Thoracic back: No deformity or tenderness. Lumbar back: Normal. No swelling, deformity or tenderness.       Right hip: Normal.      Left hip: Normal.      Right upper leg: Normal.      Left upper leg: Normal.      Right knee: Normal.      Left knee: Normal.      Right lower leg: Normal.      Left lower leg: Normal.      Right ankle: Normal.      Left ankle: Normal.      Right foot: Normal.      Left foot: Normal.   Skin:     General: Skin is warm. Capillary Refill: Capillary refill takes less than 2 seconds. Comments: Bilateral neck lacerations deep to SCM  Midline superficial neck lacerations  Bilateral antecubital fossa lacerations   Bilateral wrist lacerations   Neurological:      Mental Status: He is alert. Invasive Devices     Peripheral Intravenous Line  Duration           Peripheral IV 07/28/23 Right Forearm <1 day    Peripheral IV 07/28/23 Right;Dorsal (posterior) Hand <1 day              Lab Results: I have personally reviewed all pertinent laboratory/test results from 07/28/23, including the preceding 24 hours. Recent Labs     07/28/23  0612   HGB 11.2*   HCT 33*   CO2 22   CAIONIZED 1.10*       Imaging Results: I have personally reviewed pertinent images saved in PACS. CT scan findings (and other pertinent positive findings on images) were discussed with radiology. My interpretation of the images/reports are as follows:  Chest Xray(s): negative for acute findings   FAST exam(s): negative for acute findings   CT Scan(s): positive for acute findings: deep neck lacerations without injury to the major cervical vessles.  Lacerations deep to and involving the SCM   Additional Xray(s): N/A     Other Studies: none    Code Status: Level 1 - Full Code

## 2023-07-28 NOTE — ASSESSMENT & PLAN NOTE
- Suicide attempt by cutting neck and bilateral arms   - Below noted injuries  - Psych consult  - 1:1 monitoring

## 2023-07-28 NOTE — OP NOTE
OPERATIVE REPORT  PATIENT NAME: Masood Machado    :  1965  MRN: [id number]  Pt Location: AN OR ROOM 03    SURGERY DATE: 2023    Surgeon(s) and Role:     Rhett Diggs, DO - Primary     * Rosangela Carpenter MD - Assisting    Preop Diagnosis:  Multiple lacerations [T07. XXXA]    Post-Op Diagnosis Codes:     * Multiple lacerations [T07. XXXA]    Procedure(s):  Bilateral - REPAIR LACERATION BILATERAL NECK. WOUND WASHOUT  Bilateral - REPAIR LACERATION EXTREMITY BILATERAL ARM AND WRIST. WOUND WASHOUT    Specimen(s):  * No specimens in log *    Estimated Blood Loss:   Minimal    Drains:  * No LDAs found *    Anesthesia Type:   General    Operative Indications:  Multiple lacerations [T07. XXXA]    Operative Findings:  Right neck laceration 9 x 1 x 1 cm    Right arm laceration 9 x 1 x 0.5 cm    Right wrist laceration 5 x 0.25 x 25 cm    Left neck laceration 9 x 1 x 1 cm    Left arm laceration 9 x 1 x 0.5 cm    Left wrist laceration 5 x 1 x 0.5 cm  -Tendon injury noted intraoperatively, intraoperative consult to hand/orthopedic surgery with determination made for outpatient follow-up and repair and delayed fashion, skin was primarily repaired as above    Complications:   None    Procedure and Technique:  Patient [facility] as a level a trauma after a suicide attempt, patient entered the woods at some point yesterday while he was having suicidal ideations, sustaining multiple lacerations to his bilateral neck and bilateral upper extremities, he then reportedly came back out of the woods worrying that his wife would be concerned, unclear timeline but this was at least 15 hours ago. CTA was done to rule out vascular injury, afterwards because of the age of patient's wounds decision was made to proceed to the OR for washout and primary closure of above-mentioned wounds.     Patient was brought to the operation room, identified verbally and via hospital wristband, he was moved from his hospital bed to the OR table and secured in place by waist belt. He underwent induction of general anesthesia by our anesthesia colleagues. Surgical areas including bilateral neck and bilateral upper extremities were prepped and draped in usual sterile fashion. A timeout was called with all participating team members before proceeding with the operation. Bilateral neck and bilateral upper extremity lacerations were copiously cleansed with Betadine prepped followed by copious irrigation with sterile saline and patted dry. Above-mentioned lacerations were repaired with 3-0 nylon interrupted sutures, some additional superficial anterior neck lacerations that were not deep enough to be repaired with sutures were glued with Exofin glue. Of note there was tendon injury noted in the left wrist laceration, hand/orthopedic surgery was consulted intraoperatively and determination made for outpatient follow-up and repair, current obvious laceration was closed with 3-0 nylon interrupted sutures as mentioned above. Dressings included gauze and Kerlix wrap over bilateral upper extremities, and Xeroform over closed neck incisions covered with dry gauze secured in place by paper tape. All sponge and instrument counts were correct at conclusion of the case. Patient tolerated procedure well without complications, was extubated in OR, returned to PACU in stable condition. Dr. Diggs was present for the entire procedure.     Patient Disposition:  PACU         SIGNATURE: Pennie Schaumann, MD  DATE: July 28, 2023  TIME: 8:09 AM

## 2023-07-28 NOTE — ASSESSMENT & PLAN NOTE
- Bilateral antecubital fossa and anterior wrist lacerations   - Wounds open for approximately 16 hours prior to arrival   - OR for washout and loose approximation  - Local wound care  - Close monitoring of wounds with increased infection risk due to prolonged time to closure prior to arrival

## 2023-07-28 NOTE — ANESTHESIA PREPROCEDURE EVALUATION
Procedure:  REPAIR LACERATION BILATERAL NECK (Bilateral: Neck)  REPAIR LACERATION EXTREMITY BILATERAL ARM AND WRIST (Bilateral: Arm)    Relevant Problems   ANESTHESIA (within normal limits)      CARDIO   (+) HTN (hypertension)   (-) MI (myocardial infarction) (HCC)      PULMONARY   (+) Sleep apnea   (-) Asthma      Other   (+) Laceration of neck   (+) Suicide attempt [facility])        Physical Exam    Airway    Mallampati score: II  TM Distance: >3 FB  Neck ROM: full     Dental   No notable dental hx     Cardiovascular      Pulmonary      Other Findings        Anesthesia Plan  ASA Score- 2 Emergent    Anesthesia Type- general with ASA Monitors. Additional Monitors:   Airway Plan: ETT. Comment: Verbal consent obtained on the way to OR. Written consent not obtained due to proceeding emergently to OR. Drew Perez Plan Factors-Exercise tolerance (METS): >4 METS. Chart reviewed. Existing labs reviewed. Patient summary reviewed. Induction- intravenous. Postoperative Plan- Plan for postoperative opioid use. Informed Consent- Anesthetic plan and risks discussed with patient. I personally reviewed this patient with the CRNA. Discussed and agreed on the Anesthesia Plan with the CRNA. Drew Perez

## 2023-07-28 NOTE — DISCHARGE INSTR - AVS FIRST PAGE
Discharge Instructions - Orthopedics  Victor Hugo Regalado 59 y.o. male MRN: [id number]  Unit/Bed#: W -01    Weight Bearing Status:                                           Non weight bearing to the left hand/wrist in splint    Pain:  Continue analgesics as directed    Dressing Instructions:   Please keep clean, dry and intact until follow up     Appt Instructions: If you do not have your appointment, please call the clinic at [phone] to schedule with Dr. René Magaña the office sooner if you experience any increased numbness/tingling in the extremities. Laceration care  Seek medical attn if you develop fevers/chills/sweats or increased redness, swelling or drainage from the wound. Wash wound daily, gently with warm, soapy water. Do not scrub. Pat dry with clean towel. Do not immerse the wound in water (ie. No tub baths or swimming pools) until cleared by trauma. Follow up as directed for suture removal.   May continue with bacitracin on wounds, if requiring wound care dressing.  Place Xeroform or Adaptic on wound followed by Kerlix/Gauze

## 2023-07-28 NOTE — ASSESSMENT & PLAN NOTE
- Bilateral neck lacerations deep to the SCM   - Superficial lacerations midline  - CTA neck without injury to the major cervical vessels   - OR for washout and loose approximation - Local wound care  - Close monitoring of wounds with increased infection risk due to prolonged time to closure prior to arrival

## 2023-07-28 NOTE — PROCEDURES
POC FAST US    Date/Time: 7/28/2023 6:00 AM    Performed by: Chuck Santos PA-C  Authorized by: Chuck Santos PA-C    Patient location:  Trauma  Procedure details:     Exam Type:  Diagnostic    Indications: blunt abdominal trauma and blunt chest trauma      Assess for:  Intra-abdominal fluid and pericardial effusion    Technique: FAST      Views obtained:  Heart - Pericardial sac, LUQ - Splenorenal space, Suprapubic - Pouch of Dany and RUQ - Auguste's Pouch    Image quality: diagnostic      Image availability:  Images available in PACS and video obtained  FAST Findings:     RUQ (Hepatorenal) free fluid: absent      LUQ (Splenorenal) free fluid: absent      Suprapubic free fluid: absent      Cardiac wall motion: identified      Pericardial effusion: absent    Interpretation:     Impressions: negative

## 2023-07-29 VITALS
WEIGHT: 178.13 LBS | OXYGEN SATURATION: 97 % | TEMPERATURE: 97.9 F | BODY MASS INDEX: 25.5 KG/M2 | HEIGHT: 70 IN | OXYGEN SATURATION: 97 % | HEART RATE: 80 BPM | HEART RATE: 80 BPM | RESPIRATION RATE: 18 BRPM | BODY MASS INDEX: 25.5 KG/M2 | DIASTOLIC BLOOD PRESSURE: 78 MMHG | SYSTOLIC BLOOD PRESSURE: 147 MMHG | TEMPERATURE: 97.9 F | RESPIRATION RATE: 18 BRPM | WEIGHT: 178.13 LBS | SYSTOLIC BLOOD PRESSURE: 147 MMHG | DIASTOLIC BLOOD PRESSURE: 78 MMHG | HEIGHT: 70 IN

## 2023-07-29 PROBLEM — S69.92XA INJURY OF LEFT HAND: Status: ACTIVE | Noted: 2023-07-29

## 2023-07-29 LAB
ANION GAP SERPL CALCULATED.3IONS-SCNC: 6 MMOL/L
ANION GAP SERPL CALCULATED.3IONS-SCNC: 6 MMOL/L
BASOPHILS # BLD AUTO: 0.04 THOUSANDS/ÂΜL (ref 0–0.1)
BASOPHILS # BLD AUTO: 0.04 THOUSANDS/ÂΜL (ref 0–0.1)
BASOPHILS NFR BLD AUTO: 0 % (ref 0–1)
BASOPHILS NFR BLD AUTO: 0 % (ref 0–1)
BUN SERPL-MCNC: 17 MG/DL (ref 5–25)
BUN SERPL-MCNC: 17 MG/DL (ref 5–25)
CALCIUM SERPL-MCNC: 8.5 MG/DL (ref 8.4–10.2)
CALCIUM SERPL-MCNC: 8.5 MG/DL (ref 8.4–10.2)
CHLORIDE SERPL-SCNC: 106 MMOL/L (ref 96–108)
CHLORIDE SERPL-SCNC: 106 MMOL/L (ref 96–108)
CO2 SERPL-SCNC: 27 MMOL/L (ref 21–32)
CO2 SERPL-SCNC: 27 MMOL/L (ref 21–32)
CREAT SERPL-MCNC: 0.81 MG/DL (ref 0.6–1.3)
CREAT SERPL-MCNC: 0.81 MG/DL (ref 0.6–1.3)
EOSINOPHIL # BLD AUTO: 0.03 THOUSAND/ÂΜL (ref 0–0.61)
EOSINOPHIL # BLD AUTO: 0.03 THOUSAND/ÂΜL (ref 0–0.61)
EOSINOPHIL NFR BLD AUTO: 0 % (ref 0–6)
EOSINOPHIL NFR BLD AUTO: 0 % (ref 0–6)
ERYTHROCYTE [DISTWIDTH] IN BLOOD BY AUTOMATED COUNT: 13.1 % (ref 11.6–15.1)
ERYTHROCYTE [DISTWIDTH] IN BLOOD BY AUTOMATED COUNT: 13.1 % (ref 11.6–15.1)
GFR SERPL CREATININE-BSD FRML MDRD: 97 ML/MIN/1.73SQ M
GFR SERPL CREATININE-BSD FRML MDRD: 97 ML/MIN/1.73SQ M
GLUCOSE P FAST SERPL-MCNC: 108 MG/DL (ref 65–99)
GLUCOSE P FAST SERPL-MCNC: 108 MG/DL (ref 65–99)
GLUCOSE SERPL-MCNC: 108 MG/DL (ref 65–140)
GLUCOSE SERPL-MCNC: 108 MG/DL (ref 65–140)
HCT VFR BLD AUTO: 27.7 % (ref 36.5–49.3)
HCT VFR BLD AUTO: 27.7 % (ref 36.5–49.3)
HGB BLD-MCNC: 9.3 G/DL (ref 12–17)
HGB BLD-MCNC: 9.3 G/DL (ref 12–17)
IMM GRANULOCYTES # BLD AUTO: 0.16 THOUSAND/UL (ref 0–0.2)
IMM GRANULOCYTES # BLD AUTO: 0.16 THOUSAND/UL (ref 0–0.2)
IMM GRANULOCYTES NFR BLD AUTO: 1 % (ref 0–2)
IMM GRANULOCYTES NFR BLD AUTO: 1 % (ref 0–2)
LYMPHOCYTES # BLD AUTO: 3.47 THOUSANDS/ÂΜL (ref 0.6–4.47)
LYMPHOCYTES # BLD AUTO: 3.47 THOUSANDS/ÂΜL (ref 0.6–4.47)
LYMPHOCYTES NFR BLD AUTO: 17 % (ref 14–44)
LYMPHOCYTES NFR BLD AUTO: 17 % (ref 14–44)
MCH RBC QN AUTO: 31.8 PG (ref 26.8–34.3)
MCH RBC QN AUTO: 31.8 PG (ref 26.8–34.3)
MCHC RBC AUTO-ENTMCNC: 33.6 G/DL (ref 31.4–37.4)
MCHC RBC AUTO-ENTMCNC: 33.6 G/DL (ref 31.4–37.4)
MCV RBC AUTO: 95 FL (ref 82–98)
MCV RBC AUTO: 95 FL (ref 82–98)
MONOCYTES # BLD AUTO: 1.76 THOUSAND/ÂΜL (ref 0.17–1.22)
MONOCYTES # BLD AUTO: 1.76 THOUSAND/ÂΜL (ref 0.17–1.22)
MONOCYTES NFR BLD AUTO: 9 % (ref 4–12)
MONOCYTES NFR BLD AUTO: 9 % (ref 4–12)
NEUTROPHILS # BLD AUTO: 14.57 THOUSANDS/ÂΜL (ref 1.85–7.62)
NEUTROPHILS # BLD AUTO: 14.57 THOUSANDS/ÂΜL (ref 1.85–7.62)
NEUTS SEG NFR BLD AUTO: 73 % (ref 43–75)
NEUTS SEG NFR BLD AUTO: 73 % (ref 43–75)
NRBC BLD AUTO-RTO: 0 /100 WBCS
NRBC BLD AUTO-RTO: 0 /100 WBCS
PLATELET # BLD AUTO: 279 THOUSANDS/UL (ref 149–390)
PLATELET # BLD AUTO: 279 THOUSANDS/UL (ref 149–390)
PMV BLD AUTO: 10.4 FL (ref 8.9–12.7)
PMV BLD AUTO: 10.4 FL (ref 8.9–12.7)
POTASSIUM SERPL-SCNC: 3.8 MMOL/L (ref 3.5–5.3)
POTASSIUM SERPL-SCNC: 3.8 MMOL/L (ref 3.5–5.3)
RBC # BLD AUTO: 2.92 MILLION/UL (ref 3.88–5.62)
RBC # BLD AUTO: 2.92 MILLION/UL (ref 3.88–5.62)
SODIUM SERPL-SCNC: 139 MMOL/L (ref 135–147)
SODIUM SERPL-SCNC: 139 MMOL/L (ref 135–147)
WBC # BLD AUTO: 20.03 THOUSAND/UL (ref 4.31–10.16)
WBC # BLD AUTO: 20.03 THOUSAND/UL (ref 4.31–10.16)

## 2023-07-29 PROCEDURE — 85025 COMPLETE CBC W/AUTO DIFF WBC: CPT | Performed by: PHYSICIAN ASSISTANT

## 2023-07-29 PROCEDURE — NC001 PR NO CHARGE: Performed by: PHYSICIAN ASSISTANT

## 2023-07-29 PROCEDURE — 99238 HOSP IP/OBS DSCHRG MGMT 30/<: CPT | Performed by: PHYSICIAN ASSISTANT

## 2023-07-29 PROCEDURE — 80048 BASIC METABOLIC PNL TOTAL CA: CPT | Performed by: PHYSICIAN ASSISTANT

## 2023-07-29 RX ORDER — GINSENG 100 MG
1 CAPSULE ORAL 2 TIMES DAILY
Qty: 15 G | Refills: 0
Start: 2023-07-29

## 2023-07-29 RX ORDER — ACETAMINOPHEN 325 MG/1
975 TABLET ORAL EVERY 8 HOURS SCHEDULED
Refills: 0
Start: 2023-07-29 | End: 2023-08-14 | Stop reason: ALTCHOICE

## 2023-07-29 RX ORDER — GINSENG 100 MG
1 CAPSULE ORAL 2 TIMES DAILY
Status: DISCONTINUED | OUTPATIENT
Start: 2023-07-29 | End: 2023-07-30 | Stop reason: HOSPADM

## 2023-07-29 RX ADMIN — ATORVASTATIN CALCIUM 20 MG: 20 TABLET, FILM COATED ORAL at 08:43

## 2023-07-29 RX ADMIN — BACITRACIN ZINC 1 LARGE APPLICATION: 500 OINTMENT TOPICAL at 11:23

## 2023-07-29 RX ADMIN — ENOXAPARIN SODIUM 30 MG: 30 INJECTION SUBCUTANEOUS at 08:43

## 2023-07-29 RX ADMIN — AMLODIPINE BESYLATE 5 MG: 5 TABLET ORAL at 08:43

## 2023-07-29 RX ADMIN — PANTOPRAZOLE SODIUM 40 MG: 40 TABLET, DELAYED RELEASE ORAL at 06:19

## 2023-07-29 RX ADMIN — ACETAMINOPHEN 975 MG: 325 TABLET, FILM COATED ORAL at 06:19

## 2023-07-29 RX ADMIN — BACITRACIN ZINC 1 LARGE APPLICATION: 500 OINTMENT TOPICAL at 17:55

## 2023-07-29 RX ADMIN — ACETAMINOPHEN 975 MG: 325 TABLET, FILM COATED ORAL at 13:46

## 2023-07-29 NOTE — CONSULTS
TeleConsultation - [address] 59 y.o. male MRN: [id number]  Unit/Bed#: W -01 Encounter: [id number]        REQUIRED DOCUMENTATION:     1. This service was provided via Telemedicine. 2. Provider located at [facility].  3. TeleMed provider: Omar Potts MD.  4. Identify all parties in room with patient during tele consult: Patient   5. Patient was then informed that this was a Telemedicine visit and that the exam was being conducted confidentially over secure lines. My office door was closed. No one else was in the room. Patient acknowledged consent and understanding of privacy and security of the Telemedicine visit, and gave us permission to have the assistant stay in the room in order to assist with the history and to conduct the exam.  I informed the patient that I have reviewed their record in Epic and presented the opportunity for them to ask any questions regarding the visit today. The patient agreed to participate. Discussed with Miracle Ramos DO    Assessment/Plan     Present on Admission:  **None**    Assessment:    Unspecified Depressive Disorder     Patient with severe suicide attempt in the setting of worsening stressors as such recommend voluntary if not involuntary IP Psychiatric admission but does not require 1:1. Treatment Plan:    Planned Medication Changes:    -None     Current Medications:     Current Facility-Administered Medications   Medication Dose Route Frequency Provider Last Rate   • acetaminophen  975 mg Oral [facility] Betty Small PA-C     • amLODIPine  5 mg Oral Daily Betty Small PA-C     • atorvastatin  20 mg Oral Daily Betty Small PA-C     • enoxaparin  30 mg Subcutaneous Q12H [address] Betty Small PA-C     • pantoprazole  40 mg Oral Early Morning Betty Small PA-C         Risks / Benefits of Treatment:    Risks, benefits, and possible side effects of medications explained to patient and patient verbalizes understanding.       Other treatment modalities recommended as indicated:    · psychotherapy      Consults  Physician Requesting Consult: Ana Paula Sparrow DO  Principal Problem:<principal problem not specified>    Reason for Consult: Psych Evaluation      History of Present Illness      Patient reports that he recently quit his job without a safety net and that he was in a high level position in which he was constantly stressed. Patient states that he had an anxiety attack and that led him to harm himself. Patient states that the stress of not having a job was actually worse than the stress of the job and the family took a financial hit. Patient states that he thought he would have been able to secure a new job more easily but didn't. Patient states that when he woke up he was so upset and angry about leaving the job without a safety net and that it was a a barrage of negative feelings. Patient reports that he feels great currently as his wife has been exceptionally supportive and showed him a significant amount of love. Patient reports that he has gotten into "really shitty mindsets" and acts very much unlike himself. Patient reports that his job initially was more balanced but eventually pulled him away from his wife. Patient reports that he has been sleeping slightly better with his CPAP but overall has slept poorly in his life. Patient denied any current SI/HI/AVH or other acute psychiatric complaints. Wife provides additional collateral stating that she had noted some depressive moods when he quit his job and that he has been ruminating about past decisions.       Psychiatric Review Of Systems:    sleep: yes   appetite changes: yes  weight changes: yes  energy/anergy: yes  interest/pleasure/anhedonia: yes  somatic symptoms: no  anxiety/panic: yes  franki: no  guilty/hopeless: no  self injurious behavior/risky behavior: no    Historical Information     Past Psychiatric History:     Psychiatric Hospitalizations:   • No history of past inpatient psychiatric admissions  Outpatient Treatment History:   • Denied (History of some family therapy)   Suicide Attempts:   • None  History of self-harm:   • None  Violence History:   • No  Past Psychiatric medication trials: Denied     Substance Abuse History: Denied any tobacco use and minimal alcohol use and no hard drugs         Family Psychiatric History:  Sister:           Social History:    Education: high school diploma/GED  Learning Disabilities: Denied   Marital history:   Children: 2   Living arrangement, social support: The patient lives in home with wife. Occupational History: unemployed  Functioning Relationships: good support system.   Other Pertinent History: None    Traumatic History:     Abuse: None  Other Traumatic Events: none    Past Medical History:   Diagnosis Date   • HLD (hyperlipidemia)    • HTN (hypertension)        Medical Review Of Systems:    Review of Systems    Meds/Allergies     all current active meds have been reviewed  No Known Allergies    Objective     Vital signs in last 24 hours:  Temp:  [96.4 °F (35.8 °C)-98.3 °F (36.8 °C)] 97.9 °F (36.6 °C)  HR:  [] 91  Resp:  [14-20] 18  BP: ()/(66-91) 112/68      Intake/Output Summary (Last 24 hours) at 7/28/2023 2141  Last data filed at 7/28/2023 1900  Gross per 24 hour   Intake 1740 ml   Output --   Net 1740 ml       Mental Status Evaluation:     Appearance:  age appropriate   Behavior:  normal   Speech:  normal pitch and normal volume   Mood:  normal   Affect:  normal   Language: naming objects   Thought Process:  normal   Associations intact associations   Thought Content:  normal   Perceptual Disturbances: None   Risk Potential: Suicidal Ideations none  Homicidal Ideations none  Potential for Aggression No   Sensorium:  person, place and time/date   Cognition:  recent and remote memory grossly intact   Consciousness:  alert    Attention: attention span and concentration were age appropriate   Intellect: within normal limits   Fund of Knowledge: awareness of current events: President   Insight:  limited   Judgment: limited   Muscle Strength:  Muscle Tone: normal NFT  normal   Gait/Station: normal gait/station   Motor Activity: no abnormal movements       Lab Results: I have personally reviewed all pertinent laboratory/tests results. Most Recent Labs:   Lab Results   Component Value Date    WBC 19.43 (H) 07/28/2023    RBC 3.77 (L) 07/28/2023    HGB 11.8 (L) 07/28/2023    HCT 35.3 (L) 07/28/2023     07/28/2023    RDW 13.0 07/28/2023    NEUTROABS 17.10 (H) 07/28/2023    SODIUM 138 07/28/2023    K 4.2 07/28/2023     07/28/2023    CO2 26 07/28/2023    BUN 16 07/28/2023    CREATININE 0.89 07/28/2023    GLUC 142 (H) 07/28/2023    GLUF 142 (H) 07/28/2023    CALCIUM 9.3 07/28/2023       Imaging Studies: US bedside procedure    Result Date: 7/28/2023  Narrative: [id number]. [id number]. [id number]    CTA neck w wo contrast    Result Date: 7/28/2023  Narrative: CTA NECK INDICATION: Trauma COMPARISON:  None. TECHNIQUE:  0.625 mm images from the aortic arch through the [place] of [place] after administration of IV contrast.  This examination, like all CT scans performed in the [facility], was performed utilizing techniques to minimize radiation  dose exposure, including the use of iterative reconstruction and automated exposure control. 3-D reconstructions and multiplanar MIP images were obtained. 3D rendering was performed on an independent workstation. Rad dose 295 mGy-cm IV Contrast:  100 mL of iohexol (OMNIPAQUE) IMAGE QUALITY:   Diagnostic. FINDINGS: CERVICAL VASCULATURE AORTIC ARCH AND GREAT VESSELS:  Normal aortic arch and great vessel origins. Normal visualized subclavian vessels. RIGHT VERTEBRAL ARTERY CERVICAL SEGMENT:  Normal origin. The vessel is normal in caliber throughout the neck. LEFT VERTEBRAL ARTERY CERVICAL SEGMENT:  Normal origin.  The vessel is normal in caliber throughout the neck. RIGHT EXTRACRANIAL CAROTID SEGMENT:  Normal caliber common carotid artery. Normal bifurcation and cervical internal carotid artery. No stenosis or dissection. LEFT EXTRACRANIAL CAROTID SEGMENT:  Normal caliber common carotid artery. Normal bifurcation and cervical internal carotid artery. No stenosis or dissection. NASCET criteria was used to determine the degree of internal carotid artery diameter stenosis. INTRACRANIAL VASCULATURE ANTERIOR CIRCULATION: The visualized intracranial internal carotid arteries are unremarkable. POSTERIOR CIRCULATION: The intracranial portions of the vertebral arteries are unremarkable with normal posterior inferior cerebellar artery origins. Normal basilar artery. Small portion of posterior cerebral arteries aren't included in the field-of-view. VENOUS STRUCTURES:  The visualized dural venous sinuses and venous structures of the neck are unremarkable. Please note this is not a complete CT angiogram of the brain. BONY STRUCTURES:  No acute osseous abnormality. SOFT TISSUES OF THE NECK:   There is deep bilateral soft tissue laceration which involves SCM muscle on the right and extends to the surface of SCM muscle on the left. There is associated soft tissue swelling. THORACIC INLET:  Unremarkable. VISUALIZED BRAIN PARENCHYMA:  No acute intracranial pathology. VISUALIZED SINUSES: Small polypoid mucosal thickening versus retention cyst in the floor of right maxillary sinus. NASCET criteria was used to determine the degree of internal carotid artery diameter stenosis. Impression: 1. No evidence of acute traumatic injury in the major cervical arteries. 2.  Deep bilateral soft tissue laceration involving SCM muscle on the right and extends to the surface of SCM muscle on the left. There is no blush of contrast to suggest active bleed.  Workstation performed: [id number]     XR Trauma multiple (SLB/SLRA trauma bay ONLY)    Result Date: 7/28/2023  Narrative: TRAUMA SERIES INDICATION:  TRAUMA. COMPARISON:  None VIEWS:  XR TRAUMA MULTIPLE, XR CHEST 1 VIEW FINDINGS: CHEST: Supine frontal view of the chest is obtained. Cardiomediastinal silhouette is within normal limits accounting for technique and patient positioning. Lungs are clear. No layering pleural effusions detected. No pneumothorax is seen on this supine film. Upright images are more sensitive to detect anterior pneumothoraces if relevant. No displaced fractures. Impression: No acute cardiopulmonary disease within limitations of supine imaging. Workstation performed: [id number]     XR chest 1 view    Result Date: 7/28/2023  Narrative: TRAUMA SERIES INDICATION:  TRAUMA. COMPARISON:  None VIEWS:  XR TRAUMA MULTIPLE, XR CHEST 1 VIEW FINDINGS: CHEST: Supine frontal view of the chest is obtained. Cardiomediastinal silhouette is within normal limits accounting for technique and patient positioning. Lungs are clear. No layering pleural effusions detected. No pneumothorax is seen on this supine film. Upright images are more sensitive to detect anterior pneumothoraces if relevant. No displaced fractures. Impression: No acute cardiopulmonary disease within limitations of supine imaging. Workstation performed: [id number]     EKG/Pathology/Other Studies: No results found for: "VENTRATE", "[place]", "PRINT", "QRSDINT", "QTINT", "QTCINT", "PAXIS", "QRSAXIS", "TWAVEAXIS"     Code Status: Level 1 - Full Code  Advance Directive and Living Will:      Power of :    POLST:      Screenings:    1. Nutrition Screening  Nutrition Assessment (completed by Staff): Nutrition  Feeding: Able to feed self  Diet Type: Regular/House    2. Pain Screening  Pain Screening: Pain Assessment  Pain Assessment Tool: 0-10  Pain Score: 0  Pain Location/Orientation: Orientation: Left, Location: Arm    3. Suicide Screening  ED Crisis Suicide Risk Assessment:        Counseling / Coordination of Care:     Total floor / unit time spent today 30 minutes. Greater than 50% of total time was spent with the patient and / or family counseling and / or coordination of care. A description of the counseling / coordination of care: Direct Patient Care, Chart Review, and Documentation.

## 2023-07-29 NOTE — DISCHARGE SUMMARY
Discharge Summary - Marc Bell 59 y.o. male MRN: [id number]    Unit/Bed#: W -01 Encounter: [id number]    Admission Date:   Admission Orders (From admission, onward)     Ordered        07/28/23 0746  Place in Observation  Once                        Admitting Diagnosis: Suicide attempt ([address]) [T14.91XA]  Multiple lacerations [T07. XXXA]    HPI: Per Charity Zimmer, "Marc Bell is a 59 y.o. male with PMH HTN who presents after suicide attempt by self inflicted lacerations to the bilateral neck and arms. Patient reports he went into the woods approximately 16 hours before calling EMS. He denies LOC or fall. He was able to walk out of the woods and back home early this am. He denies any other complaints."    Procedures Performed:   Orders Placed This Encounter   Procedures   • Fast Ultrasound       Summary of Hospital Course: Patient is a 57-year-old male comes in for evaluation status post suicide attempt. Noted to have lacerations of his arms and neck. Taken to the OR urgently for repair of these lacerations. Incidentally noted to have possible tendon injury at the left wrist.  Hand surgery was consulted and recommended splinting and repeat follow-up in 1 week. He was also given follow-up with the surgical team in 1 week. He was discharged to a psychiatric facility for further evaluation and management. Patient was voluntarily agreed to the 201 which was completed at bedside prior to his discharge. Patient discharged on 7/29/2023 family updated at bedside. Significant Findings, Care, Treatment and Services Provided:   CTA neck w wo contrast    Result Date: 7/28/2023  Impression: 1. No evidence of acute traumatic injury in the major cervical arteries. 2.  Deep bilateral soft tissue laceration involving SCM muscle on the right and extends to the surface of SCM muscle on the left. There is no blush of contrast to suggest active bleed.  Workstation performed: [id number]     XR Trauma multiple (SLB/[facility] trauma bay ONLY)    Result Date: 7/28/2023  Impression: No acute cardiopulmonary disease within limitations of supine imaging. Workstation performed: [id number]     XR chest 1 view    Result Date: 7/28/2023  Impression: No acute cardiopulmonary disease within limitations of supine imaging. Workstation performed: [id number]       Complications: no complications    Discharge Diagnosis:   Patient Active Problem List   Diagnosis   • Suicide attempt [facility])   • Laceration of neck   • HTN (hypertension)   • Sleep apnea   • Laceration of multiple sites of arm   • Injury of left hand           Medical Problems     Resolved Problems  Date Reviewed: 7/29/2023   None         Condition at Discharge: good         Discharge instructions/Information to patient and family:   See after visit summary for information provided to patient and family. Provisions for Follow-Up Care:  See after visit summary for information related to follow-up care and any pertinent home health orders. PCP: No primary care provider on file. Disposition: Psychiatric placement    Planned Readmission: No      Discharge Statement   I spent 23 minutes discharging the patient. This time was spent on the day of discharge. I had direct contact with the patient on the day of discharge. Additional documentation is required if more than 30 minutes were spent on discharge. Discharge Medications:  See after visit summary for reconciled discharge medications provided to patient and family.

## 2023-07-29 NOTE — CASE MANAGEMENT
Case Management Discharge Planning Note    Patient name Xenia Mueller  Location W /W -28 MRN [id number]  : 1965 Date 2023       Current Admission Date: 2023  Current Admission Diagnosis:Laceration of neck   Patient Active Problem List    Diagnosis Date Noted   • Injury of left hand 2023   • Suicide attempt ([address]) 2023   • Laceration of neck 2023   • HTN (hypertension) 2023   • Sleep apnea 2023   • Laceration of multiple sites of arm 2023      LOS (days): 0  Geometric Mean LOS (GMLOS) (days):   Days to GMLOS:     OBJECTIVE:            Current admission status: Observation   Preferred Pharmacy:   [address] [facility] [place] [facility], [address] [place] [address] [address] [place] [address]  Phone: [phone] Fax: [phone]    Primary Care Provider: No primary care provider on file. Primary Insurance: [employer]  Secondary Insurance:     DISCHARGE DETAILS:  CM updated per [facility], patient's transport time is  with [employer] service. CM updated nursing and trauma with plan of care. CM spoke with patient's spouse Marleni at . Marleni expressed multiple questions/concerns to CM. CM contacted [facility] from [facility] regarding questions/concerns. [facility] provided contact number for spouse to call. CM provided number to spouse. Spouse aware once patient is admitted to facility, he can sign a release form to contact her with updates or speak with spouse during his stay. Spouse aware that visitation is not allowed at this time due to Covid restrictions. Spouse updated transport is set up for 1930 today to [facility].

## 2023-07-29 NOTE — ASSESSMENT & PLAN NOTE
- Suicide attempt by cutting neck and bilateral arms   - Below noted injuries  - Psych consult; appreciate recommendations  - 1:1 monitoring

## 2023-07-29 NOTE — ASSESSMENT & PLAN NOTE
- left hand/wrist laceration with possible tendon involvement   - Orthopedics consulted, appreciate input  - follow-up recommendations  - Likely outpatient follow-up for re-evaluation for possible surgery

## 2023-07-29 NOTE — CASE MANAGEMENT
Case Management Discharge Planning Note    Patient name Barney Anderson  Location W /W -36 MRN [id number]  : 1965 Date 2023       Current Admission Date: 2023  Current Admission Diagnosis:Laceration of neck   Patient Active Problem List    Diagnosis Date Noted   • Injury of left hand 2023   • Suicide attempt ([address]) 2023   • Laceration of neck 2023   • HTN (hypertension) 2023   • Sleep apnea 2023   • Laceration of multiple sites of arm 2023      LOS (days): 0  Geometric Mean LOS (GMLOS) (days):   Days to GMLOS:     OBJECTIVE:            Current admission status: Observation   Preferred Pharmacy:   [facility] [address] [address] [place] [address], [address] [place] [address] [address] [place] [address]  Phone: [phone] Fax: [phone]    Primary Care Provider: No primary care provider on file. Primary Insurance: Josiah Nolasco  Secondary Insurance:     DISCHARGE DETAILS:  CM received call from [facility] with [address] is able to accept patient today for inpatient psych placement. CM spoke with patient and spouse at the bedside. CM introduced self and role. CM discussed referral process for bed placement with a [facility] inpatient psych. Patient and spouse aware multiple referrals were placed. Patient and spouse updated [facility] is able to accept today for placement/treatment. CM discussed with patient and spouse from hospital facility is about 1 hr away. CM discussed next step is pre-certification with insurance company. Once [employer] approves, CM will work on transport to facility. CM will f/u with patient and spouse re:updates. All questions/concerns answered at this time. CM contacted insurance company at , provider services. Spoke with  Americo Shepherd. Patient approved for 7 days. -23. Auth # [id number]. Next review date 23 with Namrata Palm at .      CM contacted [facility] at [phone] with pre-certification information. Stacey Monroe aware that insurance company may reach out to her for attending name and NPI. Attending name is Dr. Faye Jones. Facility able to accept today. CM sent referral via [facility]. Waiting for transport time. Per Stacey Monroe, no nursing report needed. CM updated trauma with plan of care.

## 2023-07-29 NOTE — PROGRESS NOTES
[address]  Progress Note  Name: Gaby Steiner I  MRN: [id number]  Unit/Bed#: W -01 I Date of Admission: 7/28/2023   Date of Service: 7/29/2023 I Hospital Day: 0    Assessment/Plan   Injury of left hand  Assessment & Plan  - left hand/wrist laceration with possible tendon involvement   - Orthopedics consulted, appreciate input  - follow-up recommendations  - Likely outpatient follow-up for re-evaluation for possible surgery    Laceration of multiple sites of arm  Assessment & Plan  - Bilateral antecubital fossa and anterior wrist lacerations   - Wounds open for approximately 16 hours prior to arrival   - OR for washout and loose approximation  - Local wound care  - Close monitoring of wounds with increased infection risk due to prolonged time to closure prior to arrival    HTN (hypertension)  Assessment & Plan  - resume home medications    Suicide attempt [facility])  Assessment & Plan  - Suicide attempt by cutting neck and bilateral arms   - Below noted injuries  - Psych consult; appreciate recommendations  - 1:1 monitoring     * Laceration of neck  Assessment & Plan  - Bilateral neck lacerations deep to the SCM   - Superficial lacerations midline  - CTA neck without injury to the major cervical vessels   - OR for washout and loose approximation - Local wound care  - Close monitoring of wounds with increased infection risk due to prolonged time to closure prior to arrival        DVT prophylaxis: SCDs and Lovenox  PT and OT: eval and treat     Disposition: Patient medically stable for discharge and appropriate for placement for a psychiatric hospital. 201 signed at bedside with patient. Patient has been pleasant and cooperative with evaluations at this time. Agreeable to psychiatric stay at facility.      TRAUMA TERTIARY SURVEY NOTE    Code status:  Level 1 - Full Code    Consultants: IP CONSULT TO ORTHOPEDIC SURGERY  IP CONSULT TO PSYCHIATRY  IP CONSULT TO CASE MANAGEMENT    Subjective Transfer from: Not a transfer    Mechanism of Injury:Other: Suicide attempt     Chief Complaint: No new complaints. HPI/Last 24 hour events: Patient without new complaints today. Reports that he is feeling better and that his pain is well controlled. Objective   Vitals:   Temp:  [96.4 °F (35.8 °C)-98.1 °F (36.7 °C)] 97.8 °F (36.6 °C)  HR:  [] 84  Resp:  [14-20] 16  BP: ()/(66-91) 144/68    I/O       07/27 0701  07/28 0700 07/28 0701 07/29 0700 07/29 0701 07/30 0700    P. O.  240     I.V. (mL/kg)  1500 (18.6)     Total Intake(mL/kg)  1740 (21.5)     Net  +1740                   Physical Exam:   GENERAL APPEARANCE: NAD  NEURO: GCS 15  HEENT: Neck laceration is clean, dry, intact and well approximated  CV: RRR  LUNGS: CTA b/l  GI: Non-tender, non-distended  : no haney  MSK: bilateral arm lacerations are intact, left wrist splint in place  SKIN: warm, dry, intact    Invasive Devices     Peripheral Intravenous Line  Duration           Peripheral IV 07/28/23 Right Forearm 1 day    Peripheral IV 07/28/23 Right;Dorsal (posterior) Hand 1 day                        Lab Results:   Results: I have personally reviewed all pertinent laboratory/tests results, BMP/CMP:   Lab Results   Component Value Date    SODIUM 139 07/29/2023    K 3.8 07/29/2023     07/29/2023    CO2 27 07/29/2023    BUN 17 07/29/2023    CREATININE 0.81 07/29/2023    CALCIUM 8.5 07/29/2023    EGFR 97 07/29/2023    and CBC:   Lab Results   Component Value Date    WBC 20.03 (H) 07/29/2023    HGB 9.3 (L) 07/29/2023    HCT 27.7 (L) 07/29/2023    MCV 95 07/29/2023     07/29/2023    RBC 2.92 (L) 07/29/2023    MCH 31.8 07/29/2023    MCHC 33.6 07/29/2023    RDW 13.1 07/29/2023    MPV 10.4 07/29/2023    NRBC 0 07/29/2023       Imaging Results: I have personally reviewed pertinent reports.     Chest Xray(s): negative for acute findings   FAST exam(s): negative for acute findings   CT Scan(s): negative for acute findings   Additional Xray(s): negative for acute findings     Other Studies: no other studies

## 2023-08-01 ENCOUNTER — TELEPHONE (OUTPATIENT)
Dept: OBGYN CLINIC | Facility: HOSPITAL | Age: 58
End: 2023-08-01

## 2023-08-01 NOTE — TELEPHONE ENCOUNTER
Caller: Patient's spouse    Doctor: [facility]    Reason for call: [address], WOUND WASHOUT (Bilateral: Neck)       REPAIR LACERATION EXTREMITY BILATERAL ARM AND WRIST, WOUND WASHOUT (Bilateral: Arm)    Calling for follow-up. Looking for appt before Thursday; leaving [facility].     Call back#: [phone]

## 2023-08-03 ENCOUNTER — OFFICE VISIT (OUTPATIENT)
Dept: SURGERY | Facility: CLINIC | Age: 58
End: 2023-08-03

## 2023-08-03 VITALS — BODY MASS INDEX: 25.47 KG/M2 | TEMPERATURE: 98.6 F | HEIGHT: 70 IN | WEIGHT: 177.9 LBS

## 2023-08-03 DIAGNOSIS — T14.91XA SUICIDE ATTEMPT (HCC): ICD-10-CM

## 2023-08-03 DIAGNOSIS — L23.7 POISON IVY DERMATITIS: Primary | ICD-10-CM

## 2023-08-03 DIAGNOSIS — S69.92XD INJURY OF LEFT HAND, SUBSEQUENT ENCOUNTER: ICD-10-CM

## 2023-08-03 DIAGNOSIS — S41.119A: ICD-10-CM

## 2023-08-03 DIAGNOSIS — S11.91XD LACERATION OF NECK, SUBSEQUENT ENCOUNTER: ICD-10-CM

## 2023-08-03 RX ORDER — HYDROCORTISONE 10 MG/ML
LOTION TOPICAL 2 TIMES DAILY
Qty: 118 ML | Refills: 1 | Status: SHIPPED | OUTPATIENT
Start: 2023-08-03 | End: 2023-08-14

## 2023-08-03 NOTE — PROGRESS NOTES
Office Visit - General Surgery  [facility] Crew MRN: [id number]  Encounter: [id number]    Assessment and Plan  Problem List Items Addressed This Visit        Musculoskeletal and Integument    Laceration of multiple sites of arm     - Bilateral self-inflicted arm lacerations (bilateral antecubital and wrists) from 7/28 s/p washout and closure in the OR  - All these lacerations are in flexion/ extension areas   - Healing well, but not ready for complete suture removal at this time  - A few sutures were removed in regions were the skin was fully healed, but mostly in the center areas of the lacerations, there was still granulation tissue forming  - LUE AC laceration had some purulent and serous drainage at the superficial aspect. After cleaned and lightly debrided, there was no underlying fluctuance or drainage expressed from the laceration  - Pt reports he has been showering daily and applying soap and gently drying the lacerations  - Lacerations do not appear infected; pt denies fevers  - Applied steri strips as dressings  - Pt is leaving for an [address] (approved by Psychiatrist) in 2 days.  Recommend coming back to clinic after the cruise for wound check and hopeful full suture removal  - Recommend continuing daily shower and soap and pat to dry         Relevant Medications    hydrocortisone 1 % lotion    Poison ivy dermatitis - Primary     - Pt has a rash on bilateral LE and starting to extend to his upper extremities  - Multiple areas of erythematous vesicles with excoriation patterns  - Appears the LUE AC laceration might have some poison ivy dermatitis on the superior aspect  - Recommend continuing to wash all lacerations daily with soap and water, and do not itch/ scratch the poison ivy rash in order to avoid spreading the dermatitis  - Rx Cortisone lotion ordered for the rash         Relevant Medications    hydrocortisone 1 % lotion       Other    Suicide attempt ([address])     - 7/22 self inflicted wounds to neck and arms s/p emergent OR washout and closure  - Went to IP Psych once medically cleared and was discharged 8/3 AM before appointment  - Pt reports he is thankful to be alive and reports that he is still working through his emotions, but generally feels better. His wife is at bedside         Laceration of neck     - Anterior neck lacerations healing well without evidence of erythema, drainage, or infection. No fevers  - The right sided laceration has a palpable seroma, unable to express material from the wound   - Most sutures remain on the right side of the neck due to continued formation of granulation tissue  - Left side of neck, most sutures removed as these lacerations were well healed   - Follow up after vacation for wound check and hopeful further removal of sutures  - Continue daily shower and soap, pat to dry. Can apply dry dressings if needed         Injury of left hand     - Dr. SALAZAR [facility] consult note from 7/28 " Transverse laceration to the left wrist, grossly, neurologically intact, laceration to palmaris longus portion of the flexor tendon to the wrist. Finger flexors appear intact. This point in time, it has been washed out and closed by trauma team attending. we will treat this wrist laceration with sterile dressings, and a wrist splint  No surgical intervention required at this point in time. Patient will follow up as an outpatient for repeat evaluation"    - Pt is recommended to follow up with Dr. MARTIN DILLARD for further hand surgery recommendations  - Pt remains neurovascularly intact and has minimal/ no motor deficits of the left hand            Chief Complaint:  Allen Sue is a 59 y.o. male who presents for Fever and Follow-up (F/u suture removal sutures b/l arms and neck. Self inflicted)    Subjective  Patient reports that a week prior, he attempted suicide by cutting himself in multiple areas. He reports he was at a psychiatric facility and was discharged this morning and is feeling better.  He denies any fevers, reports he has been trying to take good care of the lacerations so that they heal well. He is worried about the cosmetics of the lacerations because he is going on vacation at the end of this week. Past Medical History:   Diagnosis Date   • High cholesterol    • HLD (hyperlipidemia)    • HTN (hypertension)        Past Surgical History:   Procedure Laterality Date   • FACIAL LACERATIONS REPAIR Bilateral 7/28/2023    Procedure: REPAIR LACERATION BILATERAL NECK, WOUND WASHOUT;  Surgeon: Osvaldo Uriarte DO;  [facility]: [facility];  Service: General   • REPAIR LACERATION Bilateral 7/28/2023    Procedure: REPAIR LACERATION EXTREMITY BILATERAL ARM AND WRIST, WOUND WASHOUT;  Surgeon: Osvaldo Uriarte DO;  [facility];  Service: General       History reviewed. No pertinent family history. Social History     Tobacco Use   • Smoking status: Never   • Smokeless tobacco: Never   Substance Use Topics   • Alcohol use: Yes   • Drug use: Never        Medications  Current Outpatient Medications on File Prior to Visit   Medication Sig Dispense Refill   • amLODIPine (NORVASC) 5 mg tablet Take 5 mg by mouth daily     • atorvastatin (LIPITOR) 20 mg tablet Take 20 mg by mouth daily     • bacitracin topical ointment 500 units/g topical ointment Apply 1 large application topically 2 (two) times a day 15 g 0   • omeprazole (PriLOSEC) 40 MG capsule Take 40 mg by mouth daily     • simvastatin (ZOCOR) 20 mg tablet Take 20 mg by mouth daily at bedtime     • acetaminophen (TYLENOL) 325 mg tablet Take 3 tablets (975 mg total) by mouth every 8 (eight) hours  0   • lisinopril (ZESTRIL) 10 mg tablet Take 10 mg by mouth daily     • tobramycin (TOBREX) 0.3 % SOLN Administer 1 drop to the right eye every 4 (four) hours while awake 5 mL 0     No current facility-administered medications on file prior to visit. Allergies  No Known Allergies    Review of Systems   Constitutional: Negative for chills and fever.    HENT: Negative for trouble swallowing and voice change. Respiratory: Negative for choking and shortness of breath. Cardiovascular: Negative for chest pain. Musculoskeletal: Negative for arthralgias and myalgias. Skin: Positive for rash and wound. Neurological: Negative for syncope, weakness, light-headedness and numbness. Objective  Vitals:    08/03/23 1433   Temp: 98.6 °F (37 °C)       Physical Exam  Vitals reviewed. Constitutional:       General: He is not in acute distress. Appearance: Normal appearance. HENT:      Head: Normocephalic and atraumatic. Right Ear: External ear normal.      Left Ear: External ear normal.      Nose: Nose normal.      Mouth/Throat:      Mouth: Mucous membranes are moist.      Pharynx: Oropharynx is clear. Eyes:      Extraocular Movements: Extraocular movements intact. Conjunctiva/sclera: Conjunctivae normal.      Pupils: Pupils are equal, round, and reactive to light. Neck:        Comments: Multiple areas of laceration with suture repair, well healing. Left side of neck, most sutures were able to be removed  Right side of neck, most sutures left in as there is an area of swelling that is likely a seroma   Cardiovascular:      Rate and Rhythm: Normal rate and regular rhythm. Pulses: Normal pulses. Heart sounds: Normal heart sounds. No murmur heard. No friction rub. No gallop. Pulmonary:      Effort: Pulmonary effort is normal. No respiratory distress. Breath sounds: Normal breath sounds. Chest:      Chest wall: No tenderness. Abdominal:      General: Abdomen is flat. Palpations: Abdomen is soft. Musculoskeletal:         General: Normal range of motion. Cervical back: Normal range of motion and neck supple. Skin:     General: Skin is warm and dry. Capillary Refill: Capillary refill takes less than 2 seconds. Findings: Rash present. Rash is vesicular.              Comments: Bilateral antecubital lacerations remain with sutures, not fully healed yet  Bilateral wrist lacerations remain with sutures, not fully healed yet   Neurological:      General: No focal deficit present. Mental Status: He is alert and oriented to person, place, and time. Sensory: No sensory deficit. Motor: No weakness.    Psychiatric:         Mood and Affect: Mood normal.         Behavior: Behavior normal.

## 2023-08-04 PROBLEM — L23.7 POISON IVY DERMATITIS: Status: ACTIVE | Noted: 2023-08-04

## 2023-08-04 NOTE — ASSESSMENT & PLAN NOTE
- Pt has a rash on bilateral LE and starting to extend to his upper extremities  - Multiple areas of erythematous vesicles with excoriation patterns  - Appears the LUE AC laceration might have some poison ivy dermatitis on the superior aspect  - Recommend continuing to wash all lacerations daily with soap and water, and do not itch/ scratch the poison ivy rash in order to avoid spreading the dermatitis  - Rx Cortisone lotion ordered for the rash

## 2023-08-04 NOTE — ASSESSMENT & PLAN NOTE
- Bilateral self-inflicted arm lacerations (bilateral antecubital and wrists) from 7/28 s/p washout and closure in the OR  - All these lacerations are in flexion/ extension areas   - Healing well, but not ready for complete suture removal at this time  - A few sutures were removed in regions were the skin was fully healed, but mostly in the center areas of the lacerations, there was still granulation tissue forming  - LUE AC laceration had some purulent and serous drainage at the superficial aspect. After cleaned and lightly debrided, there was no underlying fluctuance or drainage expressed from the laceration  - Pt reports he has been showering daily and applying soap and gently drying the lacerations  - Lacerations do not appear infected; pt denies fevers  - Applied steri strips as dressings  - Pt is leaving for an [address] (approved by Psychiatrist) in 2 days.  Recommend coming back to clinic after the cruise for wound check and hopeful full suture removal  - Recommend continuing daily shower and soap and pat to dry

## 2023-08-04 NOTE — ASSESSMENT & PLAN NOTE
- Dr. Joseph Linda consult note from 7/28 " Transverse laceration to the left wrist, grossly, neurologically intact, laceration to palmaris longus portion of the flexor tendon to the wrist. Finger flexors appear intact. This point in time, it has been washed out and closed by trauma team attending. we will treat this wrist laceration with sterile dressings, and a wrist splint  No surgical intervention required at this point in time.  Patient will follow up as an outpatient for repeat evaluation"    - Pt is recommended to follow up with Dr. Joseph Linda for further hand surgery recommendations  - Pt remains neurovascularly intact and has minimal/ no motor deficits of the left hand

## 2023-08-04 NOTE — ASSESSMENT & PLAN NOTE
- 1/26 self inflicted wounds to neck and arms s/p emergent OR washout and closure  - Went to IP Psych once medically cleared and was discharged 8/3 AM before appointment  - Pt reports he is thankful to be alive and reports that he is still working through his emotions, but generally feels better.  His wife is at bedside

## 2023-08-04 NOTE — ASSESSMENT & PLAN NOTE
- Anterior neck lacerations healing well without evidence of erythema, drainage, or infection. No fevers  - The right sided laceration has a palpable seroma, unable to express material from the wound   - Most sutures remain on the right side of the neck due to continued formation of granulation tissue  - Left side of neck, most sutures removed as these lacerations were well healed   - Follow up after vacation for wound check and hopeful further removal of sutures  - Continue daily shower and soap, pat to dry.  Can apply dry dressings if needed

## 2023-08-14 ENCOUNTER — TELEPHONE (OUTPATIENT)
Age: 58
End: 2023-08-14

## 2023-08-14 VITALS
WEIGHT: 185 LBS | HEIGHT: 70 IN | DIASTOLIC BLOOD PRESSURE: 80 MMHG | BODY MASS INDEX: 26.48 KG/M2 | SYSTOLIC BLOOD PRESSURE: 110 MMHG

## 2023-08-14 DIAGNOSIS — S61.512A WRIST LACERATION, LEFT, INITIAL ENCOUNTER: Primary | ICD-10-CM

## 2023-08-14 PROCEDURE — 99214 OFFICE O/P EST MOD 30 MIN: CPT | Performed by: ORTHOPAEDIC SURGERY

## 2023-08-14 RX ORDER — TRAZODONE HYDROCHLORIDE 50 MG/1
TABLET ORAL
COMMUNITY
Start: 2023-08-03

## 2023-08-14 NOTE — PROGRESS NOTES
ASSESSMENT/PLAN:    Assessment:   Bilateral wrist transverse lacerations  Left wrist palmaris longus laceration    Plan:   Obtain left wrist ultrasound to further evaluate the median nerve  Scar massage discussed with patient for both of these lacerations  Continue to use wrist brace on the left      To Do Next Visit:  Discuss ultrasound results    General Discussions: We discussed that he is right wrist laceration was rather superficial and on detailed physical exam he did not appear to have injured any tendons or nerve. In regards to his left wrist, pictures from initial injury that Dr. Vickki Kayser had reviewed when the patient was admitted he did have a laceration of palmaris longus that was visualized. He does have numbness in the volar thumb, index, long and ring fingers and thumb in the palm which may be related to laceration to palmar cutaneous branch of the median nerve. We discussed that we would like to further evaluate his wrist and we ordered an ultrasound determining if further management is needed. Operative Discussions:         _____________________________________________________  CHIEF COMPLAINT:  Chief Complaint   Patient presents with   • Left Wrist - Laceration, Numbness     DOI-7/28/23  Possible tendon injury    • Right Wrist - Laceration     DOI- 7/28/23         SUBJECTIVE:  Miguel Angel Rodriguez is a 59 y.o. male who presents for evaluation for bilateral transverse wrist lacerations sustained on 7/20/2023 by suicide attempt. Patient was admitted to the [facility] and underwent laceration repair of multiple areas including his neck by the trauma team.  Since then he has been discharged and has been following with with psychiatry. Today he does not complain of pain. He does complain of numbness in the left thumb, index, long, ring and palm. He denies any bruna weakness.     PAST MEDICAL HISTORY:  Past Medical History:   Diagnosis Date   • High cholesterol    • HLD (hyperlipidemia)    • HTN (hypertension)        PAST SURGICAL HISTORY:  Past Surgical History:   Procedure Laterality Date   • FACIAL LACERATIONS REPAIR Bilateral 7/28/2023    Procedure: REPAIR LACERATION BILATERAL NECK, WOUND WASHOUT;  Surgeon: Mahesh Arita ToDO;  [facility];  Service: General   • REPAIR LACERATION Bilateral 7/28/2023    Procedure: REPAIR LACERATION EXTREMITY BILATERAL ARM AND WRIST, WOUND WASHOUT;  Surgeon: Mahesh Arita To, DO;  [facility];  Service: General       FAMILY HISTORY:  History reviewed. No pertinent family history. SOCIAL HISTORY:  Social History     Tobacco Use   • Smoking status: Never   • Smokeless tobacco: Never   Substance Use Topics   • Alcohol use: Yes   • Drug use: Never       MEDICATIONS:    Current Outpatient Medications:   •  amLODIPine (NORVASC) 5 mg tablet, Take 5 mg by mouth daily, Disp: , Rfl:   •  atorvastatin (LIPITOR) 20 mg tablet, Take 20 mg by mouth daily, Disp: , Rfl:   •  bacitracin topical ointment 500 units/g topical ointment, Apply 1 large application topically 2 (two) times a day, Disp: 15 g, Rfl: 0  •  hydrocortisone 1 % lotion, Apply topically 2 (two) times a day for 7 days (Patient taking differently: Apply topically 2 (two) times a day As needed), Disp: 118 mL, Rfl: 1  •  sertraline (Zoloft) 50 mg tablet, , Disp: , Rfl:   •  omeprazole (PriLOSEC) 40 MG capsule, Take 40 mg by mouth daily, Disp: , Rfl:   •  simvastatin (ZOCOR) 20 mg tablet, Take 20 mg by mouth daily at bedtime, Disp: , Rfl:   •  traZODone (DESYREL) 50 mg tablet, , Disp: , Rfl:     ALLERGIES:  No Known Allergies    REVIEW OF SYSTEMS:  Pertinent items are noted in HPI. A comprehensive review of systems was negative.     LABS:  HgA1c: No results found for: "HGBA1C"  BMP:   Lab Results   Component Value Date    GLUCOSE 166 (H) 07/28/2023    CALCIUM 8.5 07/29/2023    K 3.8 07/29/2023    CO2 27 07/29/2023     07/29/2023    BUN 17 07/29/2023    CREATININE 0.81 07/29/2023 _____________________________________________________  PHYSICAL EXAMINATION:  Vital signs: /80   Ht 5' 10" (1.778 m)   Wt 83.9 kg (185 lb)   BMI 26.54 kg/m²   General: well developed and well nourished, alert, oriented times 3 and appears comfortable  Psychiatric: Normal  HEENT: Trachea Midline, No torticollis  Cardiovascular: No discernable arrhythmia  Pulmonary: No wheezing or stridor  Abdomen: No rebound or guarding  Extremities: No peripheral edema  Skin: No Masses, No Erythema, No Fluctuation  Neurovascular: Sensation intact to median, ulnar, radial nerve in the right hand. Sensation intact to ulnar, radial nerve in the left hand, decreased subjective sensation to light touch in the median nerve distribution    MUSCULOSKELETAL EXAMINATION:  Right wrist: Healed transverse laceration measuring about 8 cm. No signs of infection such as erythema or dehiscence. Nontender to palpation. Intact sensation to light touch to median, ulnar and radial nerve distributions. Intact function of all FDS and FDP muscle/tendon to all fingers, intact FPL. Left wrist: Healing transverse laceration measuring about 8 cm with nylon sutures in place. No signs of infection such as erythema or dehiscence. Nontender to palpation at the laceration. Intact sensation to light touch to ulnar and radial nerve distributions. Decreased subjective sensation to light touch to median nerve distribution in the palm, thumb, index, long and ring finger.   Intact function of all FDS and FDP muscles/tendons to all fingers, intact FPL.      _____________________________________________________  STUDIES REVIEWED:  No Studies to review      PROCEDURES PERFORMED:  Procedures  Left wrist nylon sutures removed

## 2023-08-14 NOTE — TELEPHONE ENCOUNTER
Please advise the patient that he should avoid swimming at this time until his follow-up appointment. Thank you.

## 2023-08-14 NOTE — TELEPHONE ENCOUNTER
Caller: patient  Doctor: [facility]    Reason for call: patient asked when can he resume swimming?     Call back#: [phone]

## 2023-08-15 ENCOUNTER — OFFICE VISIT (OUTPATIENT)
Dept: SURGERY | Facility: CLINIC | Age: 58
End: 2023-08-15
Payer: COMMERCIAL

## 2023-08-15 VITALS — TEMPERATURE: 97.9 F | BODY MASS INDEX: 25.9 KG/M2 | WEIGHT: 180.9 LBS | HEIGHT: 70 IN

## 2023-08-15 DIAGNOSIS — S11.91XD LACERATION OF NECK, SUBSEQUENT ENCOUNTER: ICD-10-CM

## 2023-08-15 DIAGNOSIS — S41.119D: Primary | ICD-10-CM

## 2023-08-15 PROBLEM — L23.7 POISON IVY DERMATITIS: Status: RESOLVED | Noted: 2023-08-04 | Resolved: 2023-08-15

## 2023-08-15 PROCEDURE — 99212 OFFICE O/P EST SF 10 MIN: CPT | Performed by: STUDENT IN AN ORGANIZED HEALTH CARE EDUCATION/TRAINING PROGRAM

## 2023-08-15 NOTE — PROGRESS NOTES
Office Visit - General Surgery  Sandra Brizuela MRN: [id number]  Encounter: [id number]    Assessment and Plan  Problem List Items Addressed This Visit        Musculoskeletal and Integument    Laceration of multiple sites of arm - Primary     - Well healed, all sutures removed prior to visit            Other    Laceration of neck     - Right side of neck sutures removed during visit   - Wound has healthy granulation tissue however there is an area of wound dehiscence. Silver nitrate applied to this area  - Patient denies fevers, chills, or drainage from this region  - Daily soap and water and pat to dry  - Discussed recommendations to help with decreasing the scars  - Recommend no swimming for another 2 weeks              Chief Complaint:  Sandra Brizuela is a 59 y.o. male who presents for Follow-up (2md fu wound check.)    Subjective   Patient is s/p Lacerations to Neck and B/L wrists, here today for follow up and suture removal. Patient states he is feeling better. He just came back from a trip to [place] with his wife. He states he is seeing a psychologist now. He seems remorseful for his actions appropriately so. Past Medical History:   Diagnosis Date   • High cholesterol    • HLD (hyperlipidemia)    • HTN (hypertension)        Past Surgical History:   Procedure Laterality Date   • FACIAL LACERATIONS REPAIR Bilateral 7/28/2023    Procedure: REPAIR LACERATION BILATERAL NECK, WOUND WASHOUT;  Surgeon: Sharon Uriarte DO;  [facility];  Service: General   • REPAIR LACERATION Bilateral 7/28/2023    Procedure: REPAIR LACERATION EXTREMITY BILATERAL ARM AND WRIST, WOUND WASHOUT;  Surgeon: Sharon Uriarte DO;  [facility];  Service: General       History reviewed. No pertinent family history. Social History     Tobacco Use   • Smoking status: Never   • Smokeless tobacco: Never   Substance Use Topics   • Alcohol use:  Yes   • Drug use: Never        Medications  Current Outpatient Medications on File Prior to Visit   Medication Sig Dispense Refill   • amLODIPine (NORVASC) 5 mg tablet Take 5 mg by mouth daily     • atorvastatin (LIPITOR) 20 mg tablet Take 20 mg by mouth daily     • bacitracin topical ointment 500 units/g topical ointment Apply 1 large application topically 2 (two) times a day 15 g 0   • omeprazole (PriLOSEC) 40 MG capsule Take 40 mg by mouth daily     • sertraline (Zoloft) 50 mg tablet      • simvastatin (ZOCOR) 20 mg tablet Take 20 mg by mouth daily at bedtime     • traZODone (DESYREL) 50 mg tablet      • hydrocortisone 1 % lotion Apply topically 2 (two) times a day for 7 days (Patient taking differently: Apply topically 2 (two) times a day As needed) 118 mL 1     No current facility-administered medications on file prior to visit. Allergies  No Known Allergies    Review of Systems    Objective  Vitals:    08/15/23 1441   Temp: 97.9 °F (36.6 °C)       Physical Exam  Constitutional:       General: He is not in acute distress. Appearance: Normal appearance. He is not toxic-appearing. HENT:      Head: Atraumatic. Neck:      Comments: Right sided lacerations with sutures intact. Some amount of hypergranulation tissue present on B/L laceration edges. Sutures removed and some white detritic area in mid wound washed, Silver nitrite applied. Left sided wounds healing well  Musculoskeletal:         General: Normal range of motion. Cervical back: Normal range of motion. Comments: B/L wrist and anticub lacerations without sutures, healing   Skin:     General: Skin is warm. Neurological:      Mental Status: He is alert.

## 2023-08-15 NOTE — ASSESSMENT & PLAN NOTE
- Right side of neck sutures removed during visit   - Wound has healthy granulation tissue however there is an area of wound dehiscence.  Silver nitrate applied to this area  - Patient denies fevers, chills, or drainage from this region  - Daily soap and water and pat to dry  - Discussed recommendations to help with decreasing the scars  - Recommend no swimming for another 2 weeks

## 2023-08-16 ENCOUNTER — OFFICE VISIT (OUTPATIENT)
Dept: SURGERY | Facility: CLINIC | Age: 58
End: 2023-08-16
Payer: COMMERCIAL

## 2023-08-16 ENCOUNTER — TELEPHONE (OUTPATIENT)
Dept: SURGERY | Facility: CLINIC | Age: 58
End: 2023-08-16

## 2023-08-16 VITALS — WEIGHT: 180.2 LBS | BODY MASS INDEX: 25.8 KG/M2 | HEIGHT: 70 IN | TEMPERATURE: 97.8 F

## 2023-08-16 DIAGNOSIS — S11.91XD LACERATION OF NECK, SUBSEQUENT ENCOUNTER: Primary | ICD-10-CM

## 2023-08-16 PROCEDURE — 99212 OFFICE O/P EST SF 10 MIN: CPT | Performed by: SURGERY

## 2023-08-16 NOTE — PROGRESS NOTES
Office Visit - General Surgery  Akira Skelton MRN: [id number]  Encounter: [id number]    Assessment and Plan  Problem List Items Addressed This Visit        Other    Laceration of neck - Primary     32 areas of wound that are open. I asked him use silver gel and a gauze on these daily. Okay to shower and clean with soap and water. See back when he returns from his trip. Chief Complaint:  Akira Skelton is a 59 y.o. male who presents for Post-op    Subjective  49-year-old male post laceration to head neck. Sutures were removed yesterday. He called complaining that he is having drainage from the wound. He was asked to come in for evaluation. Past Medical History:   Diagnosis Date   • High cholesterol    • HLD (hyperlipidemia)    • HTN (hypertension)        Past Surgical History:   Procedure Laterality Date   • FACIAL LACERATIONS REPAIR Bilateral 7/28/2023    Procedure: REPAIR LACERATION BILATERAL NECK, WOUND WASHOUT;  Surgeon: Aman Uriarte DO;  [facility];  Service: General   • REPAIR LACERATION Bilateral 7/28/2023    Procedure: REPAIR LACERATION EXTREMITY BILATERAL ARM AND WRIST, WOUND WASHOUT;  Surgeon: Aman Uriarte DO;  [facility];  Service: General       History reviewed. No pertinent family history. Social History     Tobacco Use   • Smoking status: Never   • Smokeless tobacco: Never   Vaping Use   • Vaping Use: Never used   Substance Use Topics   • Alcohol use:  Yes   • Drug use: Never        Medications  Current Outpatient Medications on File Prior to Visit   Medication Sig Dispense Refill   • amLODIPine (NORVASC) 5 mg tablet Take 5 mg by mouth daily     • atorvastatin (LIPITOR) 20 mg tablet Take 20 mg by mouth daily     • bacitracin topical ointment 500 units/g topical ointment Apply 1 large application topically 2 (two) times a day 15 g 0   • omeprazole (PriLOSEC) 40 MG capsule Take 40 mg by mouth daily     • sertraline (Zoloft) 50 mg tablet      • traZODone (DESYREL) 50 mg tablet      • hydrocortisone 1 % lotion Apply topically 2 (two) times a day for 7 days (Patient taking differently: Apply topically 2 (two) times a day As needed) 118 mL 1   • simvastatin (ZOCOR) 20 mg tablet Take 20 mg by mouth daily at bedtime (Patient not taking: Reported on 8/16/2023)       No current facility-administered medications on file prior to visit.        Allergies  No Known Allergies    Review of Systems    Objective  Vitals:    08/16/23 1021   Temp: 97.8 °F (36.6 °C)       Physical Exam  Right neck laceration healing except 2 small areas in the midportion these are both about 0.5 x 1 x 0.2 cm, clean, pink, periwound clean with dried blood

## 2023-08-16 NOTE — ASSESSMENT & PLAN NOTE
32 areas of wound that are open. I asked him use silver gel and a gauze on these daily. Okay to shower and clean with soap and water. See back when he returns from his trip.

## 2023-08-16 NOTE — TELEPHONE ENCOUNTER
Pt. Called to report that after sutures removed from neck, the wound opened up and he started seeping serosanguenous fluid. Dr. Philly Slade agreed to see him since he has a flight out of the airport at 1:30. He is coming in to the office at 10:15.

## 2023-09-26 PROBLEM — S41.119A: Status: RESOLVED | Noted: 2023-07-28 | Resolved: 2023-09-26

## 2023-09-27 PROBLEM — S11.91XA LACERATION OF NECK: Status: RESOLVED | Noted: 2023-07-28 | Resolved: 2023-09-27

## 2024-03-22 DIAGNOSIS — Z00.6 ENCOUNTER FOR EXAMINATION FOR NORMAL COMPARISON OR CONTROL IN CLINICAL RESEARCH PROGRAM: ICD-10-CM

## (undated) DEVICE — PACK MAJOR ORTHO W/SPLITS PBDS

## (undated) DEVICE — DRESSING XEROFORM 5 X 9

## (undated) DEVICE — GLOVE INDICATOR UNDERGLOVE SZ 6 BLUE

## (undated) DEVICE — PENCIL ELECTROSURG E-Z CLEAN -0035H

## (undated) DEVICE — SPONGE PVP SCRUB WING STERILE

## (undated) DEVICE — GLOVE SRG BIOGEL 5.5

## (undated) DEVICE — SCD SEQUENTIAL COMPRESSION COMFORT SLEEVE MEDIUM KNEE LENGTH: Brand: KENDALL SCD

## (undated) DEVICE — 3M™ TEGADERM™ TRANSPARENT FILM DRESSING FRAME STYLE, 1626W, 4 IN X 4-3/4 IN (10 CM X 12 CM), 50/CT 4CT/CASE: Brand: 3M™ TEGADERM™

## (undated) DEVICE — TIBURON SPLIT SHEET: Brand: CONVERTORS

## (undated) DEVICE — KERLIX BANDAGE ROLL: Brand: KERLIX

## (undated) DEVICE — 3000CC GUARDIAN II: Brand: GUARDIAN

## (undated) DEVICE — GAUZE SPONGES,16 PLY: Brand: CURITY

## (undated) DEVICE — ADHESIVE SKIN HIGH VISCOSITY EXOFIN 1ML